# Patient Record
Sex: FEMALE | Race: WHITE | Employment: UNEMPLOYED | URBAN - METROPOLITAN AREA
[De-identification: names, ages, dates, MRNs, and addresses within clinical notes are randomized per-mention and may not be internally consistent; named-entity substitution may affect disease eponyms.]

---

## 2017-03-07 ENCOUNTER — HOSPITAL ENCOUNTER (OUTPATIENT)
Dept: CT IMAGING | Age: 52
Discharge: HOME | End: 2017-03-07
Payer: COMMERCIAL

## 2017-03-07 DIAGNOSIS — C85.91 NON-HODGKIN LYMPHOMA OF LYMPH NODES OF NECK, UNSPECIFIED NON-HODGKIN LYMPHOMA TYPE (HCC): ICD-10-CM

## 2017-03-07 PROCEDURE — 74177 CT ABD & PELVIS W/CONTRAST: CPT

## 2017-03-07 PROCEDURE — 2500000003 HC RX 250 WO HCPCS: Performed by: RADIOLOGY

## 2017-03-07 PROCEDURE — 70491 CT SOFT TISSUE NECK W/DYE: CPT

## 2017-03-07 PROCEDURE — 71260 CT THORAX DX C+: CPT

## 2017-03-07 PROCEDURE — 6360000004 HC RX CONTRAST MEDICATION: Performed by: RADIOLOGY

## 2017-03-07 RX ADMIN — BARIUM SULFATE 450 ML: 21 SUSPENSION ORAL at 08:41

## 2017-03-07 RX ADMIN — IOVERSOL 100 ML: 678 INJECTION INTRA-ARTERIAL; INTRAVENOUS at 08:41

## 2017-04-10 ENCOUNTER — OFFICE VISIT (OUTPATIENT)
Dept: FAMILY MEDICINE CLINIC | Age: 52
End: 2017-04-10

## 2017-04-10 VITALS
BODY MASS INDEX: 33.27 KG/M2 | HEIGHT: 67 IN | WEIGHT: 212 LBS | DIASTOLIC BLOOD PRESSURE: 70 MMHG | RESPIRATION RATE: 12 BRPM | TEMPERATURE: 97.8 F | HEART RATE: 94 BPM | SYSTOLIC BLOOD PRESSURE: 110 MMHG

## 2017-04-10 DIAGNOSIS — J45.21 RAD (REACTIVE AIRWAY DISEASE) WITH WHEEZING, MILD INTERMITTENT, WITH ACUTE EXACERBATION: Primary | ICD-10-CM

## 2017-04-10 DIAGNOSIS — L82.0 SEBORRHEIC KERATOSIS, INFLAMED: ICD-10-CM

## 2017-04-10 PROCEDURE — 99212 OFFICE O/P EST SF 10 MIN: CPT | Performed by: FAMILY MEDICINE

## 2017-04-10 RX ORDER — ISOSORBIDE MONONITRATE 60 MG/1
TABLET, EXTENDED RELEASE ORAL
Refills: 3 | COMMUNITY
Start: 2017-03-14

## 2017-04-10 RX ORDER — ALBUTEROL SULFATE 0.63 MG/3ML
1 SOLUTION RESPIRATORY (INHALATION)
Qty: 270 ML | Status: CANCELLED | OUTPATIENT
Start: 2017-04-10

## 2017-04-10 RX ORDER — DILTIAZEM HYDROCHLORIDE 120 MG/1
120 CAPSULE, EXTENDED RELEASE ORAL 2 TIMES DAILY
COMMUNITY

## 2017-04-11 RX ORDER — ALBUTEROL SULFATE 90 UG/1
2 AEROSOL, METERED RESPIRATORY (INHALATION) EVERY 6 HOURS PRN
Qty: 1 INHALER | Refills: 3 | Status: SHIPPED | OUTPATIENT
Start: 2017-04-11

## 2017-04-17 ENCOUNTER — PROCEDURE VISIT (OUTPATIENT)
Dept: FAMILY MEDICINE CLINIC | Age: 52
End: 2017-04-17

## 2017-04-17 VITALS
SYSTOLIC BLOOD PRESSURE: 98 MMHG | HEART RATE: 89 BPM | RESPIRATION RATE: 12 BRPM | BODY MASS INDEX: 33.27 KG/M2 | TEMPERATURE: 98.7 F | DIASTOLIC BLOOD PRESSURE: 62 MMHG | HEIGHT: 67 IN | WEIGHT: 212 LBS

## 2017-04-17 DIAGNOSIS — L82.0 SEBORRHEIC KERATOSES, INFLAMED: Primary | ICD-10-CM

## 2017-04-17 PROCEDURE — 11400 EXC TR-EXT B9+MARG 0.5 CM<: CPT | Performed by: FAMILY MEDICINE

## 2017-04-20 ENCOUNTER — OFFICE VISIT (OUTPATIENT)
Dept: FAMILY MEDICINE CLINIC | Age: 52
End: 2017-04-20

## 2017-04-20 VITALS
HEIGHT: 67 IN | SYSTOLIC BLOOD PRESSURE: 96 MMHG | DIASTOLIC BLOOD PRESSURE: 62 MMHG | WEIGHT: 212 LBS | HEART RATE: 89 BPM | TEMPERATURE: 96.4 F | BODY MASS INDEX: 33.27 KG/M2 | RESPIRATION RATE: 12 BRPM

## 2017-04-20 DIAGNOSIS — L76.21 POSTPROCEDURAL HEMORRHAGE OF SKIN AND SUBCUTANEOUS TISSUE FOLLOWING A DERMATOLOGIC PROCEDURE: Primary | ICD-10-CM

## 2017-04-20 PROCEDURE — 99024 POSTOP FOLLOW-UP VISIT: CPT | Performed by: FAMILY MEDICINE

## 2017-04-20 RX ORDER — CLINDAMYCIN HYDROCHLORIDE 300 MG/1
CAPSULE ORAL
Qty: 15 CAPSULE | Refills: 0 | Status: SHIPPED | OUTPATIENT
Start: 2017-04-20 | End: 2017-11-08 | Stop reason: ALTCHOICE

## 2017-04-23 ASSESSMENT — ENCOUNTER SYMPTOMS
WHEEZING: 1
BLOOD IN STOOL: 0
SHORTNESS OF BREATH: 0
ABDOMINAL PAIN: 0
COUGH: 1
CHEST TIGHTNESS: 0

## 2017-04-26 ENCOUNTER — PATIENT MESSAGE (OUTPATIENT)
Dept: FAMILY MEDICINE CLINIC | Age: 52
End: 2017-04-26

## 2017-04-26 RX ORDER — LIDOCAINE HYDROCHLORIDE 20 MG/ML
1 INJECTION, SOLUTION INFILTRATION; PERINEURAL ONCE
Status: SHIPPED | OUTPATIENT
Start: 2017-04-26

## 2017-04-26 RX ORDER — FLUCONAZOLE 150 MG/1
TABLET ORAL
Qty: 2 TABLET | Refills: 1 | Status: SHIPPED | OUTPATIENT
Start: 2017-04-26 | End: 2017-07-05

## 2017-04-26 ASSESSMENT — ENCOUNTER SYMPTOMS
COUGH: 0
SHORTNESS OF BREATH: 0

## 2017-05-03 ENCOUNTER — OFFICE VISIT (OUTPATIENT)
Dept: FAMILY MEDICINE CLINIC | Age: 52
End: 2017-05-03

## 2017-05-03 VITALS
HEIGHT: 67 IN | TEMPERATURE: 97.9 F | HEART RATE: 89 BPM | RESPIRATION RATE: 12 BRPM | BODY MASS INDEX: 32.65 KG/M2 | WEIGHT: 208 LBS | SYSTOLIC BLOOD PRESSURE: 92 MMHG | DIASTOLIC BLOOD PRESSURE: 60 MMHG

## 2017-05-03 DIAGNOSIS — L82.0 SEBORRHEIC KERATOSIS, INFLAMED: Primary | ICD-10-CM

## 2017-05-03 DIAGNOSIS — Z12.39 SCREENING FOR BREAST CANCER: ICD-10-CM

## 2017-05-03 PROCEDURE — 99024 POSTOP FOLLOW-UP VISIT: CPT | Performed by: FAMILY MEDICINE

## 2017-05-03 ASSESSMENT — PATIENT HEALTH QUESTIONNAIRE - PHQ9
1. LITTLE INTEREST OR PLEASURE IN DOING THINGS: 0
2. FEELING DOWN, DEPRESSED OR HOPELESS: 0
SUM OF ALL RESPONSES TO PHQ QUESTIONS 1-9: 0
SUM OF ALL RESPONSES TO PHQ9 QUESTIONS 1 & 2: 0

## 2017-05-09 DIAGNOSIS — L02.219 CELLULITIS AND ABSCESS OF TRUNK: Primary | ICD-10-CM

## 2017-05-09 DIAGNOSIS — L03.319 CELLULITIS AND ABSCESS OF TRUNK: Primary | ICD-10-CM

## 2017-05-09 RX ORDER — CLINDAMYCIN HYDROCHLORIDE 150 MG/1
150 CAPSULE ORAL 4 TIMES DAILY
Qty: 28 CAPSULE | Refills: 0 | Status: SHIPPED | OUTPATIENT
Start: 2017-05-09 | End: 2017-05-16

## 2017-05-14 ASSESSMENT — ENCOUNTER SYMPTOMS
CHEST TIGHTNESS: 0
SHORTNESS OF BREATH: 0
BACK PAIN: 0

## 2017-05-31 LAB — HBA1C MFR BLD: 6.9 %

## 2017-07-05 ENCOUNTER — OFFICE VISIT (OUTPATIENT)
Dept: FAMILY MEDICINE CLINIC | Age: 52
End: 2017-07-05

## 2017-07-05 VITALS
RESPIRATION RATE: 14 BRPM | OXYGEN SATURATION: 98 % | DIASTOLIC BLOOD PRESSURE: 62 MMHG | BODY MASS INDEX: 33.45 KG/M2 | WEIGHT: 213.6 LBS | SYSTOLIC BLOOD PRESSURE: 128 MMHG | TEMPERATURE: 97.6 F | HEART RATE: 86 BPM

## 2017-07-05 DIAGNOSIS — Z87.2 H/O SEBORRHEIC KERATOSIS: ICD-10-CM

## 2017-07-05 DIAGNOSIS — E11.9 TYPE 2 DIABETES MELLITUS WITHOUT COMPLICATION, WITHOUT LONG-TERM CURRENT USE OF INSULIN (HCC): ICD-10-CM

## 2017-07-05 DIAGNOSIS — E78.5 DYSLIPIDEMIA: Primary | ICD-10-CM

## 2017-07-05 DIAGNOSIS — I20.1 CORONARY ARTERY SPASM (HCC): ICD-10-CM

## 2017-07-05 PROCEDURE — 99213 OFFICE O/P EST LOW 20 MIN: CPT | Performed by: FAMILY MEDICINE

## 2017-07-05 RX ORDER — METOPROLOL TARTRATE 100 MG/1
100 TABLET ORAL 2 TIMES DAILY
Refills: 3 | COMMUNITY
Start: 2017-06-11 | End: 2018-03-07

## 2017-07-05 RX ORDER — PEN NEEDLE, DIABETIC 31 GX5/16"
NEEDLE, DISPOSABLE MISCELLANEOUS
Refills: 2 | COMMUNITY
Start: 2017-05-22

## 2017-07-05 RX ORDER — PIOGLITAZONEHYDROCHLORIDE 15 MG/1
TABLET ORAL
Refills: 11 | COMMUNITY
Start: 2017-04-14 | End: 2017-11-08 | Stop reason: ALTCHOICE

## 2017-07-05 RX ORDER — DILTIAZEM HYDROCHLORIDE 120 MG/1
CAPSULE, EXTENDED RELEASE ORAL
Refills: 3 | COMMUNITY
Start: 2017-06-18 | End: 2017-11-08 | Stop reason: ALTCHOICE

## 2017-07-16 ASSESSMENT — ENCOUNTER SYMPTOMS
SHORTNESS OF BREATH: 0
BLOOD IN STOOL: 0
ABDOMINAL PAIN: 0

## 2017-08-15 ENCOUNTER — HOSPITAL ENCOUNTER (OUTPATIENT)
Dept: WOMENS IMAGING | Age: 52
Discharge: HOME | End: 2017-08-15
Payer: COMMERCIAL

## 2017-08-15 DIAGNOSIS — Z12.39 SCREENING FOR BREAST CANCER: ICD-10-CM

## 2017-08-15 PROCEDURE — G0202 SCR MAMMO BI INCL CAD: HCPCS

## 2017-08-21 DIAGNOSIS — R92.1 BREAST CALCIFICATIONS ON MAMMOGRAM: Primary | ICD-10-CM

## 2017-08-30 ENCOUNTER — HOSPITAL ENCOUNTER (OUTPATIENT)
Dept: ULTRASOUND IMAGING | Age: 52
End: 2017-08-30
Payer: COMMERCIAL

## 2017-08-30 ENCOUNTER — HOSPITAL ENCOUNTER (OUTPATIENT)
Dept: WOMENS IMAGING | Age: 52
Discharge: HOME | End: 2017-08-30
Payer: COMMERCIAL

## 2017-08-30 DIAGNOSIS — R92.1 BREAST CALCIFICATIONS ON MAMMOGRAM: ICD-10-CM

## 2017-08-30 PROCEDURE — G0206 DX MAMMO INCL CAD UNI: HCPCS

## 2017-10-31 LAB
CHOLESTEROL, TOTAL: 107 MG/DL
CHOLESTEROL/HDL RATIO: ABNORMAL
HDLC SERPL-MCNC: 28 MG/DL (ref 35–70)
LDL CHOLESTEROL CALCULATED: 15 MG/DL (ref 0–160)
TRIGL SERPL-MCNC: 318 MG/DL
VLDLC SERPL CALC-MCNC: 64 MG/DL

## 2017-11-08 ENCOUNTER — OFFICE VISIT (OUTPATIENT)
Dept: FAMILY MEDICINE CLINIC | Age: 52
End: 2017-11-08

## 2017-11-08 VITALS
WEIGHT: 211 LBS | RESPIRATION RATE: 12 BRPM | HEART RATE: 81 BPM | TEMPERATURE: 97.9 F | SYSTOLIC BLOOD PRESSURE: 102 MMHG | BODY MASS INDEX: 33.12 KG/M2 | HEIGHT: 67 IN | DIASTOLIC BLOOD PRESSURE: 68 MMHG

## 2017-11-08 DIAGNOSIS — J45.20 MILD INTERMITTENT REACTIVE AIRWAY DISEASE WITHOUT COMPLICATION: ICD-10-CM

## 2017-11-08 DIAGNOSIS — E78.5 DYSLIPIDEMIA: Primary | ICD-10-CM

## 2017-11-08 PROCEDURE — 99213 OFFICE O/P EST LOW 20 MIN: CPT | Performed by: FAMILY MEDICINE

## 2017-11-08 NOTE — PROGRESS NOTES
Subjective  Tim Canas, 45 y.o. female presents today with:  Chief Complaint   Patient presents with    Hyperlipidemia     4 mnth f/u        HPI  Patient presents today for a four month follow up for dyslipidemia. Overall, doing well  ; No cardio-pulmonary probs;compliant with diet/rxs;no new gi-gu complaints   Rev/rxs; No abuse nor side effects on meds   F/u Onco-clearred and cts --neg   Coryza--mild  Review of Systems   Constitutional: Negative for fatigue and unexpected weight change. Eyes: Negative for visual disturbance. Respiratory: Negative for chest tightness and shortness of breath. Cardiovascular: Negative for chest pain, palpitations and leg swelling. Gastrointestinal: Negative for abdominal pain and blood in stool. Genitourinary: Negative for dysuria, flank pain and frequency. Musculoskeletal: Positive for myalgias. Negative for arthralgias and back pain. Neurological: Negative for weakness, light-headedness and numbness. Allergies   Allergen Reactions    Cefdinir Hives    Codeine      pt states she can take synthetic codeine like in Vicodin    Darvocet [Propoxyphene N-Acetaminophen]     Sulfa Antibiotics     Tetracyclines & Related        Past Medical History:   Diagnosis Date    CAD (coronary artery disease)     Cough variant asthma     [facility] mellitus, type 2 ([facility] [address]     Diffuse large B cell lymphoma ([facility] [address]     Dyslipidemia     Hypertension     Metatarsalgia of right foot     Palpitations     Perennial allergic rhinitis      Past Surgical History:   Procedure Laterality Date    CARDIAC CATHETERIZATION  2010    CARDIAC CATHETERIZATION  03/06/14    DR Delaney Hightower CHOLECYSTECTOMY  2008    PTCA       Social History     Social History    Marital status:      Spouse name: N/A    Number of children: N/A    Years of education: N/A     Occupational History    Not on file.      Social History Main Topics    Smoking status: Never Smoker    Smokeless tobacco: Never Used    Alcohol use Yes     2 Glasses of wine per week    Drug use: No    Sexual activity: Not on file     Other Topics Concern    Not on file     Social History Narrative    No narrative on file     Family History   Problem Relation Age of Onset    Cancer Mother     High Blood Pressure Mother     High Cholesterol Father     Heart Disease Father      ARRYTHMIA, PVC'S          Current Outpatient Prescriptions on File Prior to Visit   Medication Sig Dispense Refill    B-D ULTRAFINE III SHORT PEN 31G X 8 MM MISC Use twice daily  2    Glimepiride (AMARYL PO) Take 8 mg by mouth daily      metoprolol (LOPRESSOR) 100 MG tablet 100 mg 2 times daily  3    albuterol sulfate  (90 BASE) MCG/ACT inhaler Inhale 2 puffs into the lungs every 6 hours as needed for Wheezing 1 Inhaler 3    isosorbide mononitrate (IMDUR) 60 MG extended release tablet TAKE ONE TABLET BY MOUTH EVERY DAY  3    diltiazem (CARDIZEM 12 HR) 120 MG extended release capsule Take 120 mg by mouth 2 times daily      insulin glargine (LANTUS SOLOSTAR) 100 UNIT/ML injection pen Inject 20 Units into the skin      levothyroxine (SYNTHROID) 88 MCG tablet   10    vitamin B-12 (CYANOCOBALAMIN) 1000 MCG tablet Take 1,000 mcg by mouth daily      magnesium oxide (MAG-OX) 400 MG tablet Take 400 mg by mouth      metFORMIN ER (GLUCOPHAGE-XR) 500 MG XR tablet Take 1,000 mg by mouth 2 times daily   2    clopidogrel (PLAVIX) 75 MG tablet   2    BYDUREON 2 MG SUSR injection   5    fenofibrate (TRICOR) 145 MG tablet Take 145 mg by mouth daily.  metoprolol (LOPRESSOR) 50 MG tablet Take 50 mg by mouth every morning.  lisinopril (PRINIVIL;ZESTRIL) 2.5 MG tablet Take 2.5 mg by mouth daily.  spironolactone (ALDACTONE) 25 MG tablet Take 25 mg by mouth 2 times daily.  lovastatin (MEVACOR) 20 MG tablet Take 2 tablets by mouth daily.        Cholecalciferol (VITAMIN D PO) Take 3,000 Int'l Units by mouth daily       nitroGLYCERIN (NITROSTAT) 0.4 MG SL tablet Place 0.4 mg under the tongue every 5 minutes as needed.  aspirin 81 MG EC tablet Take 81 mg by mouth daily. Current Facility-Administered Medications on File Prior to Visit   Medication Dose Route Frequency Provider Last Rate Last Dose    lidocaine 2 % injection 1 mL  1 mL Intradermal Once Reji Velazquez DO           Objective    Vitals:    11/08/17 0756   BP: 102/68   Pulse: 81   Resp: 12   Temp: 97.9 °F (36.6 °C)   TempSrc: Temporal   Weight: 211 lb (95.7 kg)   Height: 5' 7\" (1.702 m)     Physical Exam   Constitutional: She is well-developed, well-nourished, and in no distress. No distress. Eyes: No scleral icterus. Neck: Normal range of motion. Neck supple. Carotid bruit is not present. No neck rigidity. No thyroid mass and no thyromegaly present. Cardiovascular: Normal rate, regular rhythm, S1 normal, S2 normal and normal heart sounds. No extrasystoles are present. No murmur heard. Pulmonary/Chest: Effort normal and breath sounds normal.   Musculoskeletal: She exhibits no edema. Skin: She is not diaphoretic. Psychiatric: Affect normal.     Orders Only on 11/07/2017   Component Date Value Ref Range Status    Cholesterol, Total 10/31/2017 107  mg/dL Final    HDL 10/31/2017 28* 35 - 70 mg/dL Final    LDL Calculated 10/31/2017 15  0 - 160 mg/dL Final    Triglycerides 10/31/2017 318  mg/dL Final    VLDL 10/31/2017 64  mg/dL Final   rev low hdl[chronic]  Rest--ok  ; Assessment & Plan   1. Dyslipidemia     2. Mild intermittent reactive airway disease without complication     ;See above orders, as use and side effects reviewed ;Continue same meds, as common side effects and use reviewed-call if ineffective or problems ;   Outpatient Encounter Prescriptions as of 11/8/2017   Medication Sig Dispense Refill    B-D ULTRAFINE III SHORT PEN 31G X 8 MM MISC Use twice daily  2    Glimepiride (AMARYL PO) Take 8 mg by mouth daily      metoprolol (LOPRESSOR) 100 MG

## 2017-11-16 ASSESSMENT — ENCOUNTER SYMPTOMS
ABDOMINAL PAIN: 0
BLOOD IN STOOL: 0
SHORTNESS OF BREATH: 0
BACK PAIN: 0
CHEST TIGHTNESS: 0

## 2017-11-16 NOTE — PROGRESS NOTES
Subjective:      Patient ID: Silvia Loving is a 45 y.o. female.     HPI    Review of Systems    Objective:   Physical Exam    Assessment:      ***      Plan:      ***

## 2018-01-05 ENCOUNTER — OFFICE VISIT (OUTPATIENT)
Dept: FAMILY MEDICINE CLINIC | Age: 53
End: 2018-01-05

## 2018-01-05 VITALS
WEIGHT: 200 LBS | BODY MASS INDEX: 31.39 KG/M2 | SYSTOLIC BLOOD PRESSURE: 120 MMHG | RESPIRATION RATE: 14 BRPM | HEART RATE: 84 BPM | HEIGHT: 67 IN | TEMPERATURE: 96.9 F | DIASTOLIC BLOOD PRESSURE: 78 MMHG

## 2018-01-05 DIAGNOSIS — K11.20 PAROTIDITIS: Primary | ICD-10-CM

## 2018-01-05 DIAGNOSIS — M26.621 ARTHRALGIA OF RIGHT TEMPOROMANDIBULAR JOINT: ICD-10-CM

## 2018-01-05 PROCEDURE — 99213 OFFICE O/P EST LOW 20 MIN: CPT | Performed by: NURSE PRACTITIONER

## 2018-01-05 RX ORDER — AMOXICILLIN AND CLAVULANATE POTASSIUM 875; 125 MG/1; MG/1
TABLET, FILM COATED ORAL
Refills: 0 | COMMUNITY
Start: 2017-12-27 | End: 2018-01-05 | Stop reason: ALTCHOICE

## 2018-01-05 RX ORDER — METHYLPREDNISOLONE 4 MG/1
TABLET ORAL
Qty: 1 KIT | Refills: 0 | Status: SHIPPED | OUTPATIENT
Start: 2018-01-05 | End: 2018-01-15 | Stop reason: ALTCHOICE

## 2018-01-05 RX ORDER — CLINDAMYCIN HYDROCHLORIDE 300 MG/1
300 CAPSULE ORAL 4 TIMES DAILY
Qty: 28 CAPSULE | Refills: 0 | Status: SHIPPED | OUTPATIENT
Start: 2018-01-05 | End: 2018-01-12

## 2018-01-05 RX ORDER — CYCLOBENZAPRINE HCL 5 MG
5 TABLET ORAL 3 TIMES DAILY PRN
Qty: 60 TABLET | Refills: 0 | Status: SHIPPED | OUTPATIENT
Start: 2018-01-05 | End: 2018-03-07 | Stop reason: SDUPTHER

## 2018-01-05 RX ORDER — CYCLOBENZAPRINE HCL 5 MG
5 TABLET ORAL 3 TIMES DAILY PRN
COMMUNITY
End: 2018-01-05 | Stop reason: SDUPTHER

## 2018-01-10 PROBLEM — K11.20 PAROTIDITIS: Status: ACTIVE | Noted: 2018-01-10

## 2018-01-10 PROBLEM — M26.621 ARTHRALGIA OF RIGHT TEMPOROMANDIBULAR JOINT: Status: ACTIVE | Noted: 2018-01-10

## 2018-01-10 PROBLEM — E04.2 NONTOXIC MULTINODULAR GOITER: Status: ACTIVE | Noted: 2017-11-14

## 2018-01-10 NOTE — PROGRESS NOTES
Subjective  Gregoria Radford, 45 y.o. female presents today with:  Chief Complaint   Patient presents with    Jaw Pain    Temporomandibular Joint Pain       Patient presents today with pain and swelling on the right side of her jaw and face. The patient states that she has a history of parotitis and TMJ. The patient states that her jaw is clicking and sore around her ears. The patient states that her lower jaw is very swollen and painful. The patient states that this has been getting worse over the last 2 days. The patient denies fever, nasal congestion, or cough. Objective    Vitals:    01/05/18 1003   BP: 120/78   Site: Left Arm   Position: Sitting   Cuff Size: Medium Adult   Pulse: 84   Resp: 14   Temp: 96.9 °F (36.1 °C)   TempSrc: Temporal   Weight: 200 lb (90.7 kg)   Height: 5' 7\" (1.702 m)       Physical Exam   Constitutional: She is oriented to person, place, and time. She appears well-developed and well-nourished. HENT:   Head: Normocephalic and atraumatic. Right Ear: Hearing, tympanic membrane, external ear and ear canal normal.   Left Ear: Hearing, tympanic membrane, external ear and ear canal normal.   Nose: Nose normal.   Mouth/Throat: Uvula is midline, oropharynx is clear and moist and mucous membranes are normal. No dental abscesses. Eyes: EOM are normal.   Neck: Normal range of motion. Cardiovascular: Normal rate, regular rhythm and normal heart sounds. Pulmonary/Chest: Effort normal and breath sounds normal.   Abdominal: Soft. Bowel sounds are normal.   Musculoskeletal: Normal range of motion. Neurological: She is alert and oriented to person, place, and time. Skin: Skin is warm and dry. Psychiatric: She has a normal mood and affect. Assessment & Plan   1. Parotiditis  clindamycin (CLEOCIN) 300 MG capsule    Symptomatic, RXs given, Patient given information on how to reduce the inflammed parotid.     2. Arthralgia of right temporomandibular joint

## 2018-01-15 ENCOUNTER — OFFICE VISIT (OUTPATIENT)
Dept: FAMILY MEDICINE CLINIC | Age: 53
End: 2018-01-15

## 2018-01-15 VITALS
HEIGHT: 67 IN | BODY MASS INDEX: 31.39 KG/M2 | HEART RATE: 78 BPM | RESPIRATION RATE: 14 BRPM | DIASTOLIC BLOOD PRESSURE: 74 MMHG | WEIGHT: 200 LBS | TEMPERATURE: 96.6 F | SYSTOLIC BLOOD PRESSURE: 116 MMHG

## 2018-01-15 DIAGNOSIS — L25.1 CONTACT DERMATITIS AND ECZEMA DUE TO DRUGS AND MEDICINES IN CONTACT WITH SKIN: ICD-10-CM

## 2018-01-15 DIAGNOSIS — S03.40XS TMJ (SPRAIN OF TEMPOROMANDIBULAR JOINT), SEQUELA: ICD-10-CM

## 2018-01-15 DIAGNOSIS — K11.21 ACUTE PAROTITIS: Primary | ICD-10-CM

## 2018-01-15 PROCEDURE — 99213 OFFICE O/P EST LOW 20 MIN: CPT | Performed by: FAMILY MEDICINE

## 2018-01-15 RX ORDER — DILTIAZEM HYDROCHLORIDE 120 MG/1
CAPSULE, EXTENDED RELEASE ORAL
Refills: 2 | COMMUNITY
Start: 2018-01-14 | End: 2018-06-06 | Stop reason: SDUPTHER

## 2018-01-15 NOTE — PROGRESS NOTES
Subjective  Tacey Bejosse, 45 y.o. female presents today with:  Chief Complaint   Patient presents with    Temporomandibular Joint Pain     10 day f/u per NP    Rash     legs bilateral       HPI  Patient presents today for a ten day follow up per NP for temporomandibular joint pain. On NSAIDS and wmc-tmj tolerable  On cleocin-much improved swelling and pain of r-parotid  Rest/ent-neg  ; No cardio-pulmonary probs;compliant with diet/rxs;no new gi-gu complaints   Newer itchy red rash over pretibial areas after holiday moisturizer  Rev/rxs; No abuse nor side effects on meds   ; No eye/foot probs;on asa and on no tob. Review of Systems   Constitutional: Negative for fatigue, fever and unexpected weight change. HENT: Positive for ear pain (improved of pre-ear area of tmj). Negative for congestion, sinus pressure, sore throat, trouble swallowing and voice change. Eyes: Negative for visual disturbance. Respiratory: Negative for shortness of breath. Cardiovascular: Negative for chest pain, palpitations and leg swelling. Gastrointestinal: Negative for abdominal pain. Genitourinary: Positive for urgency. Negative for decreased urine volume, dysuria and flank pain. Skin: Positive for rash. Neurological: Negative for light-headedness and headaches. Hematological: Does not bruise/bleed easily. Psychiatric/Behavioral: Negative for dysphoric mood.        Allergies   Allergen Reactions    Cefdinir Hives    Codeine      pt states she can take synthetic codeine like in Vicodin    Darvocet [Propoxyphene N-Acetaminophen]     Sulfa Antibiotics     Tetracyclines & Related        Past Medical History:   Diagnosis Date    CAD (coronary artery disease)     Cough variant asthma     Diabetes mellitus, type 2 (HCC)     Diffuse large B cell lymphoma ([facility] [address]     Dyslipidemia     Hypertension     Metatarsalgia of right foot     Palpitations     Perennial allergic rhinitis      Past Surgical History:  clopidogrel (PLAVIX) 75 MG tablet   2    BYDUREON 2 MG SUSR injection   5    fenofibrate (TRICOR) 145 MG tablet Take 145 mg by mouth daily.  metoprolol (LOPRESSOR) 50 MG tablet Take 50 mg by mouth every morning.  lisinopril (PRINIVIL;ZESTRIL) 2.5 MG tablet Take 2.5 mg by mouth daily.  spironolactone (ALDACTONE) 25 MG tablet Take 25 mg by mouth 2 times daily.  lovastatin (MEVACOR) 20 MG tablet Take 2 tablets by mouth daily.  Cholecalciferol (VITAMIN D PO) Take 3,000 Int'l Units by mouth daily       aspirin 81 MG EC tablet Take 81 mg by mouth daily.  nitroGLYCERIN (NITROSTAT) 0.4 MG SL tablet Place 0.4 mg under the tongue every 5 minutes as needed. Current Facility-Administered Medications on File Prior to Visit   Medication Dose Route Frequency Provider Last Rate Last Dose    lidocaine 2 % injection 1 mL  1 mL Intradermal Once Carmel Avery DO           Objective    Vitals:    01/15/18 0918   BP: 116/74   Pulse: 78   Resp: 14   Temp: 96.6 °F (35.9 °C)   TempSrc: Temporal   Weight: 200 lb (90.7 kg)   Height: 5' 7\" (1.702 m)     Physical Exam   Constitutional: She is oriented to person, place, and time and well-developed, well-nourished, and in no distress. No distress. HENT:   Head:       Right Ear: Ear canal normal. No tenderness. Tympanic membrane is retracted. Tympanic membrane is not injected. No middle ear effusion. No decreased hearing is noted. Left Ear: Ear canal normal. No tenderness. Tympanic membrane is retracted. Tympanic membrane is not injected. No middle ear effusion. No decreased hearing is noted. Nose: Mucosal edema and rhinorrhea present. Right sinus exhibits no maxillary sinus tenderness. Left sinus exhibits no maxillary sinus tenderness. Mouth/Throat: Oropharynx is clear and moist.   Minor tx w/ opening  No swelling/tx of r-parotid   Eyes: No scleral icterus. Neck: Normal range of motion. Neck supple. Carotid bruit is not present.  No

## 2018-01-22 ASSESSMENT — ENCOUNTER SYMPTOMS
TROUBLE SWALLOWING: 0
SHORTNESS OF BREATH: 0
SORE THROAT: 0
SINUS PRESSURE: 0
ABDOMINAL PAIN: 0
VOICE CHANGE: 0

## 2018-03-06 LAB
ALBUMIN SERPL-MCNC: 4.6 G/DL
ALP BLD-CCNC: 51 U/L
ALT SERPL-CCNC: 36 U/L
ANION GAP SERPL CALCULATED.3IONS-SCNC: 18 MMOL/L
AST SERPL-CCNC: 36 U/L
AVERAGE GLUCOSE: 166
BILIRUB SERPL-MCNC: 0.6 MG/DL (ref 0.1–1.4)
BUN BLDV-MCNC: 16 MG/DL
CALCIUM SERPL-MCNC: 10.5 MG/DL
CHLORIDE BLD-SCNC: 98 MMOL/L
CO2: 22 MMOL/L
CREAT SERPL-MCNC: 1 MG/DL
GFR CALCULATED: 58
GLUCOSE BLD-MCNC: 139 MG/DL
HBA1C MFR BLD: 7.4 %
POTASSIUM SERPL-SCNC: 4.9 MMOL/L
SODIUM BLD-SCNC: 138 MMOL/L
TOTAL PROTEIN: 7.2
TSH SERPL DL<=0.05 MIU/L-ACNC: 2.37 UIU/ML

## 2018-03-07 ENCOUNTER — OFFICE VISIT (OUTPATIENT)
Dept: FAMILY MEDICINE CLINIC | Age: 53
End: 2018-03-07
Payer: COMMERCIAL

## 2018-03-07 VITALS
RESPIRATION RATE: 16 BRPM | DIASTOLIC BLOOD PRESSURE: 76 MMHG | HEART RATE: 91 BPM | HEIGHT: 67 IN | SYSTOLIC BLOOD PRESSURE: 94 MMHG | WEIGHT: 204 LBS | TEMPERATURE: 97.2 F | BODY MASS INDEX: 32.02 KG/M2

## 2018-03-07 DIAGNOSIS — E78.5 DYSLIPIDEMIA: ICD-10-CM

## 2018-03-07 DIAGNOSIS — I10 ESSENTIAL HYPERTENSION: Primary | ICD-10-CM

## 2018-03-07 DIAGNOSIS — M26.621 ARTHRALGIA OF RIGHT TEMPOROMANDIBULAR JOINT: ICD-10-CM

## 2018-03-07 PROCEDURE — 99213 OFFICE O/P EST LOW 20 MIN: CPT | Performed by: FAMILY MEDICINE

## 2018-03-07 RX ORDER — CYCLOBENZAPRINE HCL 5 MG
5 TABLET ORAL 3 TIMES DAILY PRN
Qty: 60 TABLET | Refills: 0 | Status: SHIPPED | OUTPATIENT
Start: 2018-03-07 | End: 2018-05-08 | Stop reason: SDUPTHER

## 2018-03-07 NOTE — PROGRESS NOTES
Subjective  Fátima Miguel, 52 y.o. female presents today with:  Chief Complaint   Patient presents with    Hyperlipidemia     4 mnth f/u; dyslipidemia   ENDO f/u DM --Dr. Timmy Jefferson    HPI  Patient presents today for a four month follow up for dyslipidemia. Pt did best for r-TMJ w/ wmc/flexeril--occ pain  No new adenopathy w/ hx/lymphoma  ; No cardio-pulmonary probs;compliant with diet/rxs;no new gi-gu complaints   Rev/rxs; No abuse nor side effects on meds   On better diet  Review of Systems   Constitutional: Negative for fatigue and fever. HENT: Positive for congestion and ear pain (tmj). Eyes: Negative for visual disturbance. Respiratory: Negative for cough and shortness of breath. Cardiovascular: Negative for chest pain, palpitations and leg swelling. Gastrointestinal: Negative for abdominal pain and nausea. Genitourinary: Negative for dysuria and frequency. Musculoskeletal: Positive for myalgias. Neurological: Positive for headaches. Negative for weakness and light-headedness. Hematological: Negative for adenopathy. Psychiatric/Behavioral: Negative for behavioral problems.        Allergies   Allergen Reactions    Cefdinir Hives    Codeine      pt states she can take synthetic codeine like in Vicodin    Darvocet [Propoxyphene N-Acetaminophen]     Sulfa Antibiotics     Tetracyclines & Related        Past Medical History:   Diagnosis Date    CAD (coronary artery disease)     Cough variant asthma     Diabetes mellitus, type 2 ([address]     Diffuse large B cell lymphoma ([facility] [address]     Dyslipidemia     Hypertension     Metatarsalgia of right foot     Palpitations     Perennial allergic rhinitis      Past Surgical History:   Procedure Laterality Date    CARDIAC CATHETERIZATION  2010    CARDIAC CATHETERIZATION  03/06/14    DR Eloy Watson CHOLECYSTECTOMY  2008    PTCA       Social History     Social History    Marital status:      Spouse name: N/A    Number of children: N/A    Years of education: N/A     Occupational History    Not on file. Social History Main Topics    Smoking status: Never Smoker    Smokeless tobacco: Never Used    Alcohol use Yes     2 Glasses of wine per week    Drug use: No    Sexual activity: Not on file     Other Topics Concern    Not on file     Social History Narrative    No narrative on file     Family History   Problem Relation Age of Onset    Cancer Mother     High Blood Pressure Mother     High Cholesterol Father     Heart Disease Father      ARRYTHMIA, PVC'S          Current Outpatient Prescriptions on File Prior to Visit   Medication Sig Dispense Refill    CARTIA  MG extended release capsule   2    insulin glargine (LANTUS) 100 UNIT/ML injection pen Inject 12 units subcutaneously daily in the morning.  B-D ULTRAFINE III SHORT PEN 31G X 8 MM MISC Use twice daily  2    Glimepiride (AMARYL PO) Take 8 mg by mouth daily      albuterol sulfate  (90 BASE) MCG/ACT inhaler Inhale 2 puffs into the lungs every 6 hours as needed for Wheezing 1 Inhaler 3    isosorbide mononitrate (IMDUR) 60 MG extended release tablet TAKE ONE TABLET BY MOUTH EVERY DAY  3    diltiazem (CARDIZEM 12 HR) 120 MG extended release capsule Take 120 mg by mouth 2 times daily      levothyroxine (SYNTHROID) 88 MCG tablet   10    vitamin B-12 (CYANOCOBALAMIN) 1000 MCG tablet Take 1,000 mcg by mouth daily      magnesium oxide (MAG-OX) 400 MG tablet Take 400 mg by mouth      metFORMIN ER (GLUCOPHAGE-XR) 500 MG XR tablet Take 1,000 mg by mouth 2 times daily   2    clopidogrel (PLAVIX) 75 MG tablet   2    BYDUREON 2 MG SUSR injection   5    fenofibrate (TRICOR) 145 MG tablet Take 145 mg by mouth daily.  metoprolol (LOPRESSOR) 50 MG tablet Take 50 mg by mouth every morning.  lisinopril (PRINIVIL;ZESTRIL) 2.5 MG tablet Take 2.5 mg by mouth daily.  spironolactone (ALDACTONE) 25 MG tablet Take 25 mg by mouth 2 times daily.         lovastatin magnesium oxide (MAG-OX) 400 MG tablet Take 400 mg by mouth      metFORMIN ER (GLUCOPHAGE-XR) 500 MG XR tablet Take 1,000 mg by mouth 2 times daily   2    clopidogrel (PLAVIX) 75 MG tablet   2    BYDUREON 2 MG SUSR injection   5    fenofibrate (TRICOR) 145 MG tablet Take 145 mg by mouth daily.  metoprolol (LOPRESSOR) 50 MG tablet Take 50 mg by mouth every morning.  lisinopril (PRINIVIL;ZESTRIL) 2.5 MG tablet Take 2.5 mg by mouth daily.  spironolactone (ALDACTONE) 25 MG tablet Take 25 mg by mouth 2 times daily.  lovastatin (MEVACOR) 20 MG tablet Take 2 tablets by mouth daily.  Cholecalciferol (VITAMIN D PO) Take 3,000 Int'l Units by mouth daily       nitroGLYCERIN (NITROSTAT) 0.4 MG SL tablet Place 0.4 mg under the tongue every 5 minutes as needed.  aspirin 81 MG EC tablet Take 81 mg by mouth daily.         [DISCONTINUED] cyclobenzaprine (FLEXERIL) 5 MG tablet Take 1 tablet by mouth 3 times daily as needed for Muscle spasms 60 tablet 0    [DISCONTINUED] metoprolol (LOPRESSOR) 100 MG tablet 100 mg 2 times daily  3     Facility-Administered Encounter Medications as of 3/7/2018   Medication Dose Route Frequency Provider Last Rate Last Dose    lidocaine 2 % injection 1 mL  1 mL Intradermal Once Thomas Banuelos DO          wmc/flexeriltmj prn  Orders Placed This Encounter   Procedures    Hemoglobin A1C     This order was created through External Result Entry    Comprehensive Metabolic Panel     This order was created through External Result Entry    TSH without Reflex     This order was created through External Result Entry     Orders Placed This Encounter   Medications    cyclobenzaprine (FLEXERIL) 5 MG tablet     Sig: Take 1 tablet by mouth 3 times daily as needed for Muscle spasms     Dispense:  60 tablet     Refill:  0     Medications Discontinued During This Encounter   Medication Reason    metoprolol (LOPRESSOR) 100 MG tablet     cyclobenzaprine (FLEXERIL) 5 MG tablet

## 2018-03-14 ASSESSMENT — ENCOUNTER SYMPTOMS
NAUSEA: 0
SHORTNESS OF BREATH: 0
COUGH: 0
ABDOMINAL PAIN: 0

## 2018-05-08 DIAGNOSIS — M26.621 ARTHRALGIA OF RIGHT TEMPOROMANDIBULAR JOINT: ICD-10-CM

## 2018-05-08 RX ORDER — CYCLOBENZAPRINE HCL 5 MG
5 TABLET ORAL 3 TIMES DAILY PRN
Qty: 60 TABLET | Refills: 2 | Status: SHIPPED | OUTPATIENT
Start: 2018-05-08 | End: 2018-12-11 | Stop reason: SDUPTHER

## 2018-06-06 ENCOUNTER — OFFICE VISIT (OUTPATIENT)
Dept: FAMILY MEDICINE CLINIC | Age: 53
End: 2018-06-06
Payer: COMMERCIAL

## 2018-06-06 VITALS
HEIGHT: 67 IN | WEIGHT: 211 LBS | TEMPERATURE: 95.8 F | BODY MASS INDEX: 33.12 KG/M2 | RESPIRATION RATE: 14 BRPM | SYSTOLIC BLOOD PRESSURE: 116 MMHG | DIASTOLIC BLOOD PRESSURE: 78 MMHG | HEART RATE: 75 BPM

## 2018-06-06 DIAGNOSIS — E03.9 ACQUIRED HYPOTHYROIDISM: ICD-10-CM

## 2018-06-06 DIAGNOSIS — Z12.39 BREAST CANCER SCREENING: ICD-10-CM

## 2018-06-06 DIAGNOSIS — C85.81 LARGE CELL LYMPHOMA OF LYMPH NODES OF NECK (HCC): ICD-10-CM

## 2018-06-06 DIAGNOSIS — E78.5 DYSLIPIDEMIA: Primary | ICD-10-CM

## 2018-06-06 PROCEDURE — 99213 OFFICE O/P EST LOW 20 MIN: CPT | Performed by: FAMILY MEDICINE

## 2018-06-06 RX ORDER — SPIRONOLACTONE 25 MG/1
25 TABLET ORAL
COMMUNITY
Start: 2018-03-14

## 2018-06-06 RX ORDER — GLIMEPIRIDE 4 MG/1
TABLET ORAL
COMMUNITY
Start: 2018-05-11 | End: 2018-10-26 | Stop reason: ALTCHOICE

## 2018-06-06 RX ORDER — METOPROLOL TARTRATE 100 MG/1
TABLET ORAL
Refills: 2 | COMMUNITY
Start: 2018-06-01

## 2018-06-06 RX ORDER — PIOGLITAZONEHYDROCHLORIDE 30 MG/1
30 TABLET ORAL
COMMUNITY
Start: 2018-03-14

## 2018-06-06 ASSESSMENT — PATIENT HEALTH QUESTIONNAIRE - PHQ9
SUM OF ALL RESPONSES TO PHQ9 QUESTIONS 1 & 2: 0
SUM OF ALL RESPONSES TO PHQ QUESTIONS 1-9: 0
1. LITTLE INTEREST OR PLEASURE IN DOING THINGS: 0
2. FEELING DOWN, DEPRESSED OR HOPELESS: 0

## 2018-06-13 ASSESSMENT — ENCOUNTER SYMPTOMS
SHORTNESS OF BREATH: 0
NAUSEA: 0
ABDOMINAL PAIN: 0

## 2018-08-16 ENCOUNTER — HOSPITAL ENCOUNTER (OUTPATIENT)
Dept: WOMENS IMAGING | Age: 53
Discharge: HOME | End: 2018-08-18
Payer: COMMERCIAL

## 2018-08-16 DIAGNOSIS — Z12.39 BREAST CANCER SCREENING: ICD-10-CM

## 2018-08-16 PROCEDURE — 77067 SCR MAMMO BI INCL CAD: CPT

## 2018-10-16 ENCOUNTER — OFFICE VISIT (OUTPATIENT)
Dept: FAMILY MEDICINE CLINIC | Age: 53
End: 2018-10-16
Payer: COMMERCIAL

## 2018-10-16 VITALS
BODY MASS INDEX: 32.58 KG/M2 | OXYGEN SATURATION: 98 % | TEMPERATURE: 96.9 F | RESPIRATION RATE: 18 BRPM | HEART RATE: 80 BPM | SYSTOLIC BLOOD PRESSURE: 122 MMHG | DIASTOLIC BLOOD PRESSURE: 68 MMHG | WEIGHT: 208 LBS

## 2018-10-16 DIAGNOSIS — H60.01 ABSCESS, EARLOBE, RIGHT: Primary | ICD-10-CM

## 2018-10-16 PROCEDURE — 10060 I&D ABSCESS SIMPLE/SINGLE: CPT | Performed by: INTERNAL MEDICINE

## 2018-10-16 PROCEDURE — 99213 OFFICE O/P EST LOW 20 MIN: CPT | Performed by: INTERNAL MEDICINE

## 2018-10-16 RX ORDER — CLINDAMYCIN HYDROCHLORIDE 300 MG/1
300 CAPSULE ORAL 3 TIMES DAILY
Qty: 15 CAPSULE | Refills: 0 | Status: SHIPPED | OUTPATIENT
Start: 2018-10-16 | End: 2018-10-21

## 2018-10-16 ASSESSMENT — ENCOUNTER SYMPTOMS
FACIAL SWELLING: 0
COUGH: 0
DIARRHEA: 0
ABDOMINAL PAIN: 0
NAUSEA: 0
SORE THROAT: 0
VOMITING: 0
SINUS PAIN: 0
SHORTNESS OF BREATH: 0
RHINORRHEA: 0
WHEEZING: 0
SINUS PRESSURE: 0

## 2018-10-16 NOTE — PROGRESS NOTES
Subjective:      Patient ID: Emi Carrel is a 52 y.o. female    Cyst on the back of the rigth ear x 3 days    HPI    Pt presents with 3 days of painful cyst and redness on the back of the right ear. The swelling had been for 2 months but increased in size 3 days ago. No fever or chills, no ear pain or discharge. Hx NHL, 6 years in remission. Past Medical History:   Diagnosis Date    CAD (coronary artery disease)     Cough variant asthma     Diabetes mellitus, type 2 ([facility] [address]     Diffuse large B cell lymphoma ([facility] [address]     Dyslipidemia     Hypertension     Metatarsalgia of right foot     Palpitations     Perennial allergic rhinitis      Past Surgical History:   Procedure Laterality Date    CARDIAC CATHETERIZATION  2010    CARDIAC CATHETERIZATION  03/06/14    DR Mackenzie Barker CHOLECYSTECTOMY  2008    PTCA       Social History     Social History    Marital status:      Spouse name: N/A    Number of children: N/A    Years of education: N/A     Occupational History    Not on file.      Social History Main Topics    Smoking status: Never Smoker    Smokeless tobacco: Never Used    Alcohol use Yes     2 Glasses of wine per week    Drug use: No    Sexual activity: Not on file     Other Topics Concern    Not on file     Social History Narrative    No narrative on file     Family History   Problem Relation Age of Onset    Cancer Mother     High Blood Pressure Mother     High Cholesterol Father     Heart Disease Father         ARRYTHMIA, PVC'S     Allergies:  Cefdinir; Codeine; Darvocet [propoxyphene n-acetaminophen]; Sulfa antibiotics; and Tetracyclines & related  Patient Active Problem List   Diagnosis    Diabetes mellitus, type 2 ([facility] [address]    Dyslipidemia    CAD (coronary artery disease)    Palpitations    Hypertension    Enthesopathy of hip region    Thoracic spondylosis without myelopathy    Acquired hypothyroidism    Other specified endocrine disorders    Dysmetabolic syndrome X    Orders Placed This Encounter   Procedures    [address] - [place] DRAIN SKIN ABSCESS SIMPLE     Orders Placed This Encounter   Medications    clindamycin (CLEOCIN) 300 MG capsule     Sig: Take 1 capsule by mouth 3 times daily for 5 days     Dispense:  15 capsule     Refill:  0       Return if symptoms worsen or fail to improve.

## 2018-10-26 ENCOUNTER — OFFICE VISIT (OUTPATIENT)
Dept: FAMILY MEDICINE CLINIC | Age: 53
End: 2018-10-26
Payer: COMMERCIAL

## 2018-10-26 DIAGNOSIS — I49.3 SYMPTOMATIC PVCS: ICD-10-CM

## 2018-10-26 DIAGNOSIS — E03.9 ACQUIRED HYPOTHYROIDISM: ICD-10-CM

## 2018-10-26 DIAGNOSIS — R00.2 PALPITATIONS: ICD-10-CM

## 2018-10-26 DIAGNOSIS — J45.20 MILD INTERMITTENT ASTHMA WITHOUT COMPLICATION: Primary | ICD-10-CM

## 2018-10-26 PROCEDURE — 99213 OFFICE O/P EST LOW 20 MIN: CPT | Performed by: FAMILY MEDICINE

## 2018-10-26 NOTE — PROGRESS NOTES
60 MG extended release tablet TAKE ONE TABLET BY MOUTH EVERY DAY  3    diltiazem (CARDIZEM 12 HR) 120 MG extended release capsule Take 120 mg by mouth 2 times daily      levothyroxine (SYNTHROID) 88 MCG tablet   10    vitamin B-12 (CYANOCOBALAMIN) 1000 MCG tablet Take 1,000 mcg by mouth daily      magnesium oxide (MAG-OX) 400 MG tablet Take 400 mg by mouth      metFORMIN ER (GLUCOPHAGE-XR) 500 MG XR tablet Take 1,000 mg by mouth 2 times daily   2    clopidogrel (PLAVIX) 75 MG tablet   2    fenofibrate (TRICOR) 145 MG tablet Take 145 mg by mouth daily.  lisinopril (PRINIVIL;ZESTRIL) 2.5 MG tablet Take 2.5 mg by mouth daily.  Cholecalciferol (VITAMIN D PO) Take 3,000 Int'l Units by mouth daily       nitroGLYCERIN (NITROSTAT) 0.4 MG SL tablet Place 0.4 mg under the tongue every 5 minutes as needed.  aspirin 81 MG EC tablet Take 81 mg by mouth daily.  [DISCONTINUED] glimepiride (AMARYL) 4 MG tablet 1/2 tab twice/day with meals      [DISCONTINUED] insulin glargine (LANTUS) 100 UNIT/ML injection pen inject  4 units each morning      lovastatin (MEVACOR) 20 MG tablet Take 2 tablets by mouth daily. Facility-Administered Encounter Medications as of 10/26/2018   Medication Dose Route Frequency Provider Last Rate Last Dose    lidocaine 2 % injection 1 mL  1 mL Intradermal Once Sally Wagner, DO          ; If worsening chest pain/weakness/ or SOB, get to ER; call 911 if severe or rapidly worsening symptoms. No orders of the defined types were placed in this encounter. No orders of the defined types were placed in this encounter. Medications Discontinued During This Encounter   Medication Reason    glimepiride (AMARYL) 4 MG tablet Therapy completed    insulin glargine (LANTUS) 100 UNIT/ML injection pen Therapy completed     Return in about 3 months (around 1/26/2019) for gen. Call or return to clinic prn if these symptoms worsen or fail to improve as anticipated.       Perez Hernández

## 2018-11-04 VITALS
HEART RATE: 48 BPM | HEIGHT: 67 IN | RESPIRATION RATE: 14 BRPM | DIASTOLIC BLOOD PRESSURE: 66 MMHG | TEMPERATURE: 96.9 F | WEIGHT: 212 LBS | SYSTOLIC BLOOD PRESSURE: 104 MMHG | BODY MASS INDEX: 33.27 KG/M2

## 2018-11-04 PROBLEM — J45.20 MILD INTERMITTENT ASTHMA WITHOUT COMPLICATION: Status: ACTIVE | Noted: 2018-11-04

## 2018-11-04 ASSESSMENT — ENCOUNTER SYMPTOMS
NAUSEA: 0
SHORTNESS OF BREATH: 0
RHINORRHEA: 1
CHEST TIGHTNESS: 0
SINUS PAIN: 0
SINUS PRESSURE: 0
ABDOMINAL PAIN: 0
STRIDOR: 0
COUGH: 0

## 2019-03-04 PROBLEM — Z85.72 PERSONAL HISTORY OF NON-HODGKIN LYMPHOMAS: Status: ACTIVE | Noted: 2019-03-04

## 2019-03-19 ENCOUNTER — OFFICE VISIT (OUTPATIENT)
Dept: FAMILY MEDICINE CLINIC | Age: 54
End: 2019-03-19
Payer: COMMERCIAL

## 2019-03-19 VITALS
TEMPERATURE: 98.6 F | HEIGHT: 67 IN | SYSTOLIC BLOOD PRESSURE: 114 MMHG | HEART RATE: 80 BPM | WEIGHT: 208 LBS | BODY MASS INDEX: 32.65 KG/M2 | RESPIRATION RATE: 14 BRPM | DIASTOLIC BLOOD PRESSURE: 68 MMHG

## 2019-03-19 DIAGNOSIS — E78.5 DYSLIPIDEMIA: ICD-10-CM

## 2019-03-19 DIAGNOSIS — J45.20 MILD INTERMITTENT ASTHMA WITHOUT COMPLICATION: Primary | ICD-10-CM

## 2019-03-19 DIAGNOSIS — I49.3 SYMPTOMATIC PVCS: ICD-10-CM

## 2019-03-19 DIAGNOSIS — Z85.72 HX OF LYMPHOMA, NON-HODGKINS: ICD-10-CM

## 2019-03-19 DIAGNOSIS — Z12.39 SCREENING FOR MALIGNANT NEOPLASM OF BREAST: ICD-10-CM

## 2019-03-19 PROCEDURE — 99213 OFFICE O/P EST LOW 20 MIN: CPT | Performed by: FAMILY MEDICINE

## 2019-03-19 RX ORDER — CYCLOBENZAPRINE HCL 5 MG
TABLET ORAL
Qty: 60 TABLET | Refills: 1 | Status: SHIPPED | OUTPATIENT
Start: 2019-03-19

## 2019-03-24 ASSESSMENT — ENCOUNTER SYMPTOMS
ABDOMINAL PAIN: 0
COUGH: 0
BLOOD IN STOOL: 0
SHORTNESS OF BREATH: 0
NAUSEA: 0
SINUS PRESSURE: 0

## 2021-06-03 ENCOUNTER — TELEPHONE (OUTPATIENT)
Dept: PAIN MANAGEMENT | Age: 56
End: 2021-06-03

## 2021-06-07 ENCOUNTER — PROCEDURE VISIT (OUTPATIENT)
Dept: PAIN MANAGEMENT | Age: 56
End: 2021-06-07
Payer: COMMERCIAL

## 2021-06-07 DIAGNOSIS — R20.0 LEFT LEG NUMBNESS: ICD-10-CM

## 2021-06-07 DIAGNOSIS — M47.817 LUMBOSACRAL SPONDYLOSIS WITHOUT MYELOPATHY: ICD-10-CM

## 2021-06-07 PROCEDURE — 64493 INJ PARAVERT F JNT L/S 1 LEV: CPT | Performed by: PAIN MEDICINE

## 2021-06-07 PROCEDURE — 64495 INJ PARAVERT F JNT L/S 3 LEV: CPT | Performed by: PAIN MEDICINE

## 2021-06-07 PROCEDURE — 64494 INJ PARAVERT F JNT L/S 2 LEV: CPT | Performed by: PAIN MEDICINE

## 2021-06-07 RX ORDER — LIDOCAINE HYDROCHLORIDE 10 MG/ML
2 INJECTION, SOLUTION EPIDURAL; INFILTRATION; INTRACAUDAL; PERINEURAL ONCE
Status: COMPLETED | OUTPATIENT
Start: 2021-06-07 | End: 2021-06-07

## 2021-06-07 RX ORDER — BETAMETHASONE SODIUM PHOSPHATE AND BETAMETHASONE ACETATE 3; 3 MG/ML; MG/ML
6 INJECTION, SUSPENSION INTRA-ARTICULAR; INTRALESIONAL; INTRAMUSCULAR; SOFT TISSUE ONCE
Status: COMPLETED | OUTPATIENT
Start: 2021-06-07 | End: 2021-06-07

## 2021-06-07 RX ADMIN — BETAMETHASONE SODIUM PHOSPHATE AND BETAMETHASONE ACETATE 6 MG: 3; 3 INJECTION, SUSPENSION INTRA-ARTICULAR; INTRALESIONAL; INTRAMUSCULAR; SOFT TISSUE at 09:08

## 2021-06-07 RX ADMIN — LIDOCAINE HYDROCHLORIDE 2 ML: 10 INJECTION, SOLUTION EPIDURAL; INFILTRATION; INTRACAUDAL; PERINEURAL at 09:07

## 2021-06-07 NOTE — PROGRESS NOTES
[facility]) Physicians  Neurosurgery and Pain [facility]  [address], [address] [facility] , [address] [facility], [facility] [address]: [phone]  F: [phone]       LUMBAR FACET JOINT INJECTION       Provider: Agustin Osei DO          Patient Name: Sophia Alfonso : 1965        Date: 2021        Sophia Alfonso is here today for interventional pain management. Preoperatively, the patient presents with facet joint mediated pain as per history and exam. Patient has failed NSAIDs, PT, and conservative treatment. Patient has significant psychological and functional impairment due to these conditions. Standard ASI guidelines were followed and sterile technique used. Area was cleaned with Betadine x3. Informed consent was obtained. Fluoroscopic guidance was used for this procedure. Appropriate oblique views were used to open up the facet joints. Appropriate length spinal needle was advanced to each facet joint. Negative aspiration was achieved. Approximately 6mg Celestone was divided equally and 0.5 cc of 1% preservative free Lidocaine was injected in the joints injected without difficulty. Patient tolerated the procedure well, no obvious complications occurred during the procedure. Patient was appropriately monitored and discharged home in stable condition with their usual motor strength. Post Op instructions were given to patient. Patient told to resume blood thinners if they stopped them prior to procedure. Relevent and recent imaging reviewed with patient today.            [x] Bilateral [] T12-L1       [] L1-2      [] Right [] L2-3       [x] L3-4      [] Left [x] L4-5         [x] L5-S1                           Physician: Agustin Osei DO

## 2021-06-09 ENCOUNTER — HOSPITAL ENCOUNTER (OUTPATIENT)
Dept: MRI IMAGING | Age: 56
Discharge: HOME | End: 2021-06-11
Payer: COMMERCIAL

## 2021-06-09 DIAGNOSIS — R20.0 LEFT LEG NUMBNESS: ICD-10-CM

## 2021-06-09 DIAGNOSIS — M47.817 LUMBOSACRAL SPONDYLOSIS WITHOUT MYELOPATHY: ICD-10-CM

## 2021-06-09 PROCEDURE — 72148 MRI LUMBAR SPINE W/O DYE: CPT

## 2021-06-17 ENCOUNTER — OFFICE VISIT (OUTPATIENT)
Dept: PAIN MANAGEMENT | Age: 56
End: 2021-06-17
Payer: COMMERCIAL

## 2021-06-17 VITALS — HEIGHT: 67 IN | WEIGHT: 200 LBS | TEMPERATURE: 96.8 F | BODY MASS INDEX: 31.39 KG/M2

## 2021-06-17 DIAGNOSIS — M47.817 LUMBOSACRAL SPONDYLOSIS WITHOUT MYELOPATHY: ICD-10-CM

## 2021-06-17 DIAGNOSIS — R20.0 LEFT LEG NUMBNESS: ICD-10-CM

## 2021-06-17 DIAGNOSIS — M54.16 LUMBAR RADICULOPATHY: Primary | ICD-10-CM

## 2021-06-17 PROCEDURE — 99214 OFFICE O/P EST MOD 30 MIN: CPT | Performed by: NURSE PRACTITIONER

## 2021-06-17 RX ORDER — MELOXICAM 15 MG/1
15 TABLET ORAL DAILY
Qty: 30 TABLET | Refills: 0 | Status: SHIPPED | OUTPATIENT
Start: 2021-06-17 | End: 2021-07-17

## 2021-06-17 ASSESSMENT — ENCOUNTER SYMPTOMS
SHORTNESS OF BREATH: 0
SORE THROAT: 0
ABDOMINAL PAIN: 0
BACK PAIN: 1

## 2021-06-17 NOTE — PROGRESS NOTES
Johanna Suggs  (1965)    6/17/2021    Subjective:     Johanna Suggs is 60 y.o. female who complains today of:    Chief Complaint   Patient presents with    Back Pain     Lower         Allergies:  Cefdinir, Codeine, Darvocet [propoxyphene n-acetaminophen], Sulfa antibiotics, and Tetracyclines & related    Past Medical History:   Diagnosis Date    CAD (coronary artery disease)     Cough variant asthma     [facility] mellitus, [facility] 2 [facility]     Diffuse large B cell lymphoma ([facility]     Dyslipidemia     Eczema     Hypertension     Hypothyroidism     Metatarsalgia of right foot     Palpitations     Perennial allergic rhinitis      Past Surgical History:   Procedure Laterality Date    CARDIAC CATHETERIZATION  2010    CARDIAC CATHETERIZATION  03/06/14    DR [facility] CHOLECYSTECTOMY  2008    CORONARY ANGIOPLASTY WITH STENT PLACEMENT      x2    OTHER SURGICAL HISTORY Right     Breast cyst    PTCA      TONSILLECTOMY       Family History   Problem Relation Age of Onset    Cancer Mother         ampullary    High Blood Pressure Mother     High Cholesterol Father     Heart Disease Father         ARRYTHMIA, PVC'S     Social History     Socioeconomic History    Marital status:      Spouse name: Not on file    Number of children: 1    Years of education: Not on file    Highest education level: Not on file   Occupational History    Occupation: Employed -    Tobacco Use    Smoking status: Never Smoker    Smokeless tobacco: Never Used   Substance and Sexual Activity    Alcohol use: Yes     Types: 2 Glasses of wine per week     Comment: occasional    Drug use: No    Sexual activity: Not on file   Other Topics Concern    Not on file   Social History Narrative    Not on file     Social Determinants of Health     Financial Resource Strain:     Difficulty of Paying Living Expenses:    Food Insecurity:     Worried About Running Out of Food in the Last Year:     Karolyn murphy Food in the Last Year:    Transportation Needs:     Lack of Transportation (Medical):      Lack of Transportation (Non-Medical):    Physical Activity:     Days of Exercise per Week:     Minutes of Exercise per Session:    Stress:     Feeling of Stress :    Social Connections:     Frequency of Communication with Friends and Family:     Frequency of Social Gatherings with Friends and Family:     Attends [employer] Services:     Active Member of Clubs or Organizations:     Attends Club or Organization Meetings:     Marital Status:    Intimate Partner Violence:     Fear of Current or Ex-Partner:     Emotionally Abused:     Physically Abused:     Sexually Abused:        Current Outpatient Medications on File Prior to Visit   Medication Sig Dispense Refill    cyclobenzaprine (FLEXERIL) 5 MG tablet TAKE ONE TABLET BY MOUTH THREE TIMES A DAY AS NEEDED for muscle spasms 60 tablet 1    pioglitazone (ACTOS) 30 MG tablet Take 30 mg by mouth      Dulaglutide 1.5 MG/0.5ML SOPN Once weekly      metoprolol (LOPRESSOR) 100 MG tablet   2    spironolactone (ALDACTONE) 25 MG tablet Take 25 mg by mouth       fluticasone (FLONASE) 50 MCG/ACT nasal spray 2 sprays by Nasal route daily 1 Bottle 5    B-D ULTRAFINE III SHORT PEN 31G X 8 MM MISC Use twice daily  2    albuterol sulfate  (90 BASE) MCG/ACT inhaler Inhale 2 puffs into the lungs every 6 hours as needed for Wheezing 1 Inhaler 3    isosorbide mononitrate (IMDUR) 60 MG extended release tablet TAKE ONE TABLET BY MOUTH EVERY DAY  3    diltiazem (CARDIZEM 12 HR) 120 MG extended release capsule Take 120 mg by mouth 2 times daily      levothyroxine (SYNTHROID) 88 MCG tablet   10    vitamin B-12 (CYANOCOBALAMIN) 1000 MCG tablet Take 1,000 mcg by mouth daily      magnesium oxide (MAG-OX) 400 MG tablet Take 400 mg by mouth      metFORMIN ER (GLUCOPHAGE-XR) 500 MG XR tablet Take 1,000 mg by mouth 2 times daily   2    clopidogrel (PLAVIX) 75 MG tablet   2    fenofibrate (TRICOR) 145 MG tablet Take 145 mg by mouth daily.  lisinopril (PRINIVIL;ZESTRIL) 2.5 MG tablet Take 2.5 mg by mouth daily.  lovastatin (MEVACOR) 20 MG tablet Take 2 tablets by mouth daily.  Cholecalciferol (VITAMIN D PO) Take 3,000 Int'l Units by mouth daily       nitroGLYCERIN (NITROSTAT) 0.4 MG SL tablet Place 0.4 mg under the tongue every 5 minutes as needed.  aspirin 81 MG EC tablet Take 81 mg by mouth daily. Current Facility-Administered Medications on File Prior to Visit   Medication Dose Route Frequency Provider Last Rate Last Admin    lidocaine 2 % injection 1 mL  1 mL Intradermal Once Daniel Weston,                Pt presents today for a f/u of chronic low back pain. Recent facet injections did not help. Pt feels pain level and functioning improves with prescribed medications and is able to walk longer. Continues Ibuprofen OTC. Pt feels that bending and laying down aggravates the pain. She did 6 sessions of PT which has improved her flexibility but now also having more left leg numbness. She hurt her back shoveling snow in February and has been getting worse. She has been allowed to come off Plavix (history stents), hopefully will not have to go back on and is able to take some NSAIDS. Also taking Flexeril. She is a diabetic. She has a history of Non Hodgkins 5 years ago and is in remission. Denies back surgery.      MRI from 2013 report shows: MULTILEVEL BROAD-BASED DISK PROTRUSIONS FROM L2-3 THROUGH L4-5 MOST PROMINENT AT L2-3 WITH MILD-MODERATE CENTRAL CANAL NARROWING AT THIS LEVEL. Lumbar MRI 6/11/21 L2-3 moderate canal stenosis and mild BL foraminal stenosis; L3-4 mild canal and foraminal stenosis; L4-5 disc protrusion with displacement of left L5 nerve root and compromise of left L4 nerve root & mild canal stenosis. 6/7/21 BL L3-4, 4-5, 5-S1 facet inj          Review of Systems   Constitutional: Negative for fever.    HENT: Negative for congestion and sore throat. Respiratory: Negative for shortness of breath. Gastrointestinal: Negative for abdominal pain. Genitourinary: Negative for difficulty urinating. Musculoskeletal: Positive for back pain. Neurological: Negative for speech difficulty and headaches. Hematological: Negative for adenopathy. Psychiatric/Behavioral: Negative for agitation. All other systems reviewed and are negative. Objective:     Vitals:  Temp 96.8 °F (36 °C)   Ht 5' 7\" (1.702 m)   Wt 200 lb (90.7 kg)   BMI 31.32 kg/m² Pain Score:   5      Physical Exam  Vitals and nursing note reviewed. Pt is alert and oriented x 3. Recent and remote memory is intact. Mood, judgement and affect are normal.  No signs of distress or SOB noted. Visualized skin intact. Sensation intact to light touch. Decreased ROM with flexion and extension of low back. Tender with palpation to bilateral lumbar spine with positive provacative maneuvers noted. Positive left SLR. Pt is able to briefly heel walk and toe walk. Strength, balance, and coordination are functional for ambulation. Assessment:      Diagnosis Orders   1. Lumbar radiculopathy  RI NJX AA&/STRD TFRML EPI LUMBAR/SACRAL 1 LEVEL    RI NJX AA&/STRD TFRML EPI LUMBAR/SACRAL EA ADDL   2. Lumbosacral spondylosis without myelopathy     3. Left leg numbness         Plan:          Orders Placed This Encounter   Medications    meloxicam (MOBIC) 15 MG tablet     Sig: Take 1 tablet by mouth daily     Dispense:  30 tablet     Refill:  0       Orders Placed This Encounter   Procedures    RI NJX AA&/STRD TFRML EPI LUMBAR/SACRAL 1 LEVEL     Left L4 & L5 RONIT with SK     Standing Status:   Future     Standing Expiration Date:   9/15/2021    RI NJX AA&/STRD TFRML EPI LUMBAR/SACRAL EA ADDL     Left L4 & L5 RONIT with SK     Standing Status:   Future     Standing Expiration Date:   9/15/2021     Discussed options with the patient today.  Facet injections did not help. MRI reviewed in detail with patient. Has completed 6 sessions of PT, with worsening left leg N/T. Will start Meloxicam for pain. Will get Left L4& L5 RONIT for her radicular pain.  She will schedule f/u with Dr. Susy Oliver and Dr. Eulogio Loomis for 2nd opinion. Consider Gabapentin for future if RONIT is not helpful. All questions were answered.  Pt verbalized understanding and agrees with above plan. Relevant imaging reviewed.            Will continue medications for chronic pain as they do help pt function with ADL and improve quality of life.   This NP saw pt under direct supervision of Dr. Damian Chawla. Follow up:  Return in about 4 weeks (around 7/15/2021) for review meds and reassess pain.     Radhika Mccauley, APRN - CNP

## 2021-06-18 ENCOUNTER — TELEPHONE (OUTPATIENT)
Dept: PAIN MANAGEMENT | Age: 56
End: 2021-06-18

## 2021-06-18 NOTE — TELEPHONE ENCOUNTER
ORDER PLACED:    Date: 06/17/21  Description: LEFT L4, L5 RONIT  Order Number: [id number]  Ordering Provider: FAITH  Performing Provider: ANISH  CPT Codes: 45947  ICD10 Codes: M54.16

## 2021-06-18 NOTE — TELEPHONE ENCOUNTER
LEFT L4, L5 RONIT    OK to schedule procedure approved as above. Please note sides/levels approved and date range.    (If applicable, sides/levels approved may differ from those ordered)      [address] [place] [address] Dr Arredondo

## 2021-06-18 NOTE — TELEPHONE ENCOUNTER
BENEFITS:    Insurance: DAT  Phone: [phone]  Contact Name: Reymundo Valentin Date: 8-     Plan year: CALENDER  Deductible: $700     Deductible Met: $700  Allowed/benefits paid at: 80% AFTER DED  OOP: $5700 MET:$2726.95  Freq Limits: 6 PER YEAR  Prior Auth Requirement: NO AUTH REQUIRED    Notes:     Call Reference #: [id number]    Time of call: 3:15PM

## 2021-08-04 ENCOUNTER — HOSPITAL ENCOUNTER (OUTPATIENT)
Dept: MRI IMAGING | Age: 56
Discharge: HOME | End: 2021-08-06
Payer: COMMERCIAL

## 2021-08-04 DIAGNOSIS — M51.24 DISPLACEMENT OF THORACIC INTERVERTEBRAL DISC WITHOUT MYELOPATHY: ICD-10-CM

## 2021-08-04 PROCEDURE — 72146 MRI CHEST SPINE W/O DYE: CPT

## 2021-08-17 ENCOUNTER — TELEPHONE (OUTPATIENT)
Dept: PAIN MANAGEMENT | Age: 56
End: 2021-08-17

## 2021-08-17 NOTE — TELEPHONE ENCOUNTER
Patient called wanting to let Dr. Melody Gonzalez know that she went to see Dr. Sawyer Montalvo who is recommending patient have a  L 2,3 L 4,5 T 11,12 diskectomy. She is going to go ahead with the surgery.

## 2023-05-02 ENCOUNTER — OFFICE VISIT (OUTPATIENT)
Dept: PRIMARY CARE | Facility: CLINIC | Age: 58
End: 2023-05-02
Payer: COMMERCIAL

## 2023-05-02 VITALS
DIASTOLIC BLOOD PRESSURE: 70 MMHG | SYSTOLIC BLOOD PRESSURE: 112 MMHG | OXYGEN SATURATION: 98 % | HEIGHT: 66 IN | HEART RATE: 83 BPM | WEIGHT: 200 LBS | RESPIRATION RATE: 16 BRPM | TEMPERATURE: 96.4 F | BODY MASS INDEX: 32.14 KG/M2

## 2023-05-02 DIAGNOSIS — L30.9 ECZEMA OF HAND: ICD-10-CM

## 2023-05-02 DIAGNOSIS — E78.2 MIXED HYPERLIPIDEMIA: ICD-10-CM

## 2023-05-02 DIAGNOSIS — J01.01 ACUTE RECURRENT MAXILLARY SINUSITIS: ICD-10-CM

## 2023-05-02 DIAGNOSIS — I10 BENIGN ESSENTIAL HYPERTENSION: Primary | ICD-10-CM

## 2023-05-02 DIAGNOSIS — R00.2 PALPITATIONS: ICD-10-CM

## 2023-05-02 DIAGNOSIS — E03.9 ACQUIRED HYPOTHYROIDISM: ICD-10-CM

## 2023-05-02 PROBLEM — Z98.890 STATUS POST LUMBAR SPINE SURGERY FOR DECOMPRESSION OF SPINAL CORD: Status: ACTIVE | Noted: 2023-05-02

## 2023-05-02 PROBLEM — E78.5 DYSLIPIDEMIA: Status: ACTIVE | Noted: 2023-05-02

## 2023-05-02 PROBLEM — R35.89 POLYURIA: Status: ACTIVE | Noted: 2023-05-02

## 2023-05-02 PROBLEM — J30.1 SEASONAL ALLERGIC RHINITIS DUE TO POLLEN: Status: ACTIVE | Noted: 2023-05-02

## 2023-05-02 PROBLEM — M26.621 ARTHRALGIA OF RIGHT TEMPOROMANDIBULAR JOINT: Status: ACTIVE | Noted: 2018-01-10

## 2023-05-02 PROBLEM — I20.9 ANGINA, CLASS III (CMS-HCC): Status: ACTIVE | Noted: 2023-05-02

## 2023-05-02 PROBLEM — J45.20 MILD INTERMITTENT ASTHMA WITHOUT COMPLICATION (HHS-HCC): Status: ACTIVE | Noted: 2018-11-04

## 2023-05-02 PROBLEM — E11.9 DIABETES MELLITUS, TYPE 2 (MULTI): Status: ACTIVE | Noted: 2023-05-02

## 2023-05-02 PROBLEM — E04.2 NONTOXIC MULTINODULAR GOITER: Status: ACTIVE | Noted: 2017-11-14

## 2023-05-02 PROBLEM — J32.9 CHRONIC SINUSITIS: Status: ACTIVE | Noted: 2023-05-02

## 2023-05-02 PROBLEM — K11.20 PAROTIDITIS: Status: ACTIVE | Noted: 2018-01-10

## 2023-05-02 PROBLEM — Z85.72 HISTORY OF NON-HODGKIN'S LYMPHOMA: Status: ACTIVE | Noted: 2019-03-04

## 2023-05-02 PROBLEM — M54.16 LUMBAR RADICULOPATHY: Status: ACTIVE | Noted: 2023-05-02

## 2023-05-02 PROBLEM — I49.3 FREQUENT UNIFOCAL PVCS: Status: ACTIVE | Noted: 2023-05-02

## 2023-05-02 PROBLEM — I87.2 VENOUS INSUFFICIENCY (CHRONIC) (PERIPHERAL): Status: ACTIVE | Noted: 2023-05-02

## 2023-05-02 PROBLEM — B37.9 YEAST INFECTION: Status: ACTIVE | Noted: 2023-05-02

## 2023-05-02 PROBLEM — J45.909 ASTHMA, ACUTE (HHS-HCC): Status: ACTIVE | Noted: 2023-05-02

## 2023-05-02 PROBLEM — R00.0 TACHYCARDIA: Status: ACTIVE | Noted: 2023-05-02

## 2023-05-02 PROBLEM — M48.062 LUMBAR STENOSIS WITH NEUROGENIC CLAUDICATION: Status: ACTIVE | Noted: 2023-05-02

## 2023-05-02 PROBLEM — M51.24 HERNIATION OF THORACIC INTERVERTEBRAL DISC WITHOUT MYELOPATHY: Status: ACTIVE | Noted: 2023-05-02

## 2023-05-02 PROCEDURE — 1036F TOBACCO NON-USER: CPT | Performed by: FAMILY MEDICINE

## 2023-05-02 PROCEDURE — 3074F SYST BP LT 130 MM HG: CPT | Performed by: FAMILY MEDICINE

## 2023-05-02 PROCEDURE — 3078F DIAST BP <80 MM HG: CPT | Performed by: FAMILY MEDICINE

## 2023-05-02 PROCEDURE — 4010F ACE/ARB THERAPY RXD/TAKEN: CPT | Performed by: FAMILY MEDICINE

## 2023-05-02 PROCEDURE — 99214 OFFICE O/P EST MOD 30 MIN: CPT | Performed by: FAMILY MEDICINE

## 2023-05-02 RX ORDER — ACETAMINOPHEN, DEXTROMETHORPHAN HBR, DOXYLAMINE SUCCINATE, PHENYLEPHRINE HCL 650; 20; 12.5; 1 MG/30ML; MG/30ML; MG/30ML; MG/30ML
1 SOLUTION ORAL DAILY
COMMUNITY

## 2023-05-02 RX ORDER — CHOLECALCIFEROL (VITAMIN D3) 50 MCG
2000 TABLET ORAL DAILY
COMMUNITY

## 2023-05-02 RX ORDER — MELOXICAM 15 MG/1
1 TABLET ORAL DAILY
COMMUNITY
Start: 2021-06-17 | End: 2023-05-02

## 2023-05-02 RX ORDER — PIOGLITAZONEHYDROCHLORIDE 30 MG/1
30 TABLET ORAL DAILY
COMMUNITY

## 2023-05-02 RX ORDER — DILTIAZEM HYDROCHLORIDE 120 MG/1
120 CAPSULE, EXTENDED RELEASE ORAL DAILY
COMMUNITY
Start: 2023-04-29

## 2023-05-02 RX ORDER — FLUTICASONE PROPIONATE 50 MCG
1 SPRAY, SUSPENSION (ML) NASAL DAILY
COMMUNITY
Start: 2018-05-08

## 2023-05-02 RX ORDER — METFORMIN HYDROCHLORIDE 500 MG/1
2 TABLET, EXTENDED RELEASE ORAL 2 TIMES DAILY
COMMUNITY
Start: 2015-11-10

## 2023-05-02 RX ORDER — LISINOPRIL 2.5 MG/1
2.5 TABLET ORAL DAILY
COMMUNITY
End: 2024-01-10 | Stop reason: SDUPTHER

## 2023-05-02 RX ORDER — TRIAMCINOLONE ACETONIDE 1 MG/G
1 OINTMENT TOPICAL 2 TIMES DAILY
Qty: 80 G | Refills: 3 | Status: SHIPPED | OUTPATIENT
Start: 2023-05-02

## 2023-05-02 RX ORDER — LEVOTHYROXINE SODIUM 88 UG/1
88 TABLET ORAL DAILY
COMMUNITY

## 2023-05-02 RX ORDER — AZITHROMYCIN 250 MG/1
TABLET, FILM COATED ORAL
Qty: 6 TABLET | Refills: 0 | Status: SHIPPED | OUTPATIENT
Start: 2023-05-02 | End: 2023-09-05 | Stop reason: ALTCHOICE

## 2023-05-02 RX ORDER — CETIRIZINE HYDROCHLORIDE 10 MG/1
10 TABLET ORAL DAILY
COMMUNITY

## 2023-05-02 RX ORDER — FENOFIBRATE 145 MG/1
1 TABLET, FILM COATED ORAL DAILY
COMMUNITY
Start: 2022-05-16 | End: 2023-11-30 | Stop reason: SDUPTHER

## 2023-05-02 RX ORDER — LANOLIN ALCOHOL/MO/W.PET/CERES
1 CREAM (GRAM) TOPICAL 2 TIMES DAILY
COMMUNITY

## 2023-05-02 RX ORDER — LOVASTATIN 20 MG/1
20 TABLET ORAL NIGHTLY
COMMUNITY

## 2023-05-02 RX ORDER — SODIUM FLUORIDE1.1%, POTASSIUM NITRATE 5% 5.8; 57.5 MG/ML; MG/ML
GEL, DENTIFRICE DENTAL
COMMUNITY
Start: 2022-11-14 | End: 2023-12-07 | Stop reason: WASHOUT

## 2023-05-02 RX ORDER — METOPROLOL TARTRATE 100 MG/1
100 TABLET ORAL 2 TIMES DAILY
COMMUNITY

## 2023-05-02 RX ORDER — DULAGLUTIDE 4.5 MG/.5ML
4.5 INJECTION, SOLUTION SUBCUTANEOUS
COMMUNITY
End: 2023-12-06 | Stop reason: ALTCHOICE

## 2023-05-02 RX ORDER — ASPIRIN 81 MG/1
1 TABLET ORAL DAILY
COMMUNITY

## 2023-05-02 RX ORDER — ALBUTEROL SULFATE 90 UG/1
2 AEROSOL, METERED RESPIRATORY (INHALATION) EVERY 4 HOURS PRN
COMMUNITY

## 2023-05-02 RX ORDER — SODIUM FLUORIDE 5 MG/G
GEL, DENTIFRICE DENTAL
COMMUNITY
Start: 2021-04-05

## 2023-05-02 RX ORDER — NITROGLYCERIN 0.4 MG/1
0.4 TABLET SUBLINGUAL EVERY 5 MIN PRN
COMMUNITY

## 2023-05-02 RX ORDER — CYCLOBENZAPRINE HCL 10 MG
10 TABLET ORAL 3 TIMES DAILY PRN
COMMUNITY
End: 2023-07-17 | Stop reason: SDUPTHER

## 2023-05-02 RX ORDER — SPIRONOLACTONE 25 MG/1
25 TABLET ORAL DAILY
COMMUNITY

## 2023-05-02 RX ORDER — TRIAMCINOLONE ACETONIDE 1 MG/G
1 OINTMENT TOPICAL 2 TIMES DAILY
COMMUNITY
Start: 2018-01-15 | End: 2023-05-02 | Stop reason: SDUPTHER

## 2023-05-02 RX ORDER — ISOSORBIDE MONONITRATE 60 MG/1
60 TABLET, EXTENDED RELEASE ORAL DAILY
COMMUNITY
End: 2023-10-25 | Stop reason: SDUPTHER

## 2023-05-02 NOTE — PROGRESS NOTES
Subjective   Patient ID: Israel Swartz is a 58 y.o. female who presents for Sinusitis (4 month follow up ).  HPI  Is an 58-year-old female with a history of palpitations current deficiency hypertension dyslipidemia type 2 diabetes is here to recheck allergy rhinitis.  Patient is at increasing bilateral ear pressure right maxillary sinus and right retro-orbital pain.  I did review her chronic medicines does take 2 antilipid therapy in addition to her beta-blocker aspirin and antihypertensive medicines.  His breathing is been stable takes albuterol if needed also takes 88 mcg of Synthroid daily.  Did review recent  chemistry lipid TSH A1c which been stable very minimally elevated A1c.  Does follow endocrinology and is on Trulicity Actos metformin.  Does follow with cardiology and has been stable with fewer palpitations.  Otherwise remains active without exertional chest pain or shortness of breath palpitations.  Gratefully no taste or smell disturbance takes mild antihistamines and nasal steroids for rhinorrhea had a clearing cough and more of a sinus pressure sinus pain in the last 3 to 5 days.  Was negative  Review of Systems   Constitutional:  Negative for fatigue, fever and unexpected weight change.   HENT:  Positive for rhinorrhea, sinus pressure and sinus pain. Negative for sore throat and trouble swallowing.    Eyes:  Negative for visual disturbance.   Respiratory:  Negative for cough, chest tightness and shortness of breath.    Cardiovascular:  Negative for chest pain, palpitations and leg swelling.   Gastrointestinal:  Negative for abdominal pain and blood in stool.   Genitourinary:  Positive for frequency. Negative for dysuria and hematuria.   Musculoskeletal:  Positive for arthralgias. Negative for myalgias.   Skin:  Negative for rash.   Neurological:  Negative for dizziness, weakness, numbness and headaches.   Hematological:  Negative for adenopathy.   Psychiatric/Behavioral:  Negative for  "behavioral problems, sleep disturbance and suicidal ideas.        Objective   /70 (BP Location: Left arm, Patient Position: Sitting, BP Cuff Size: Adult)   Pulse 83   Temp 35.8 °C (96.4 °F) (Temporal)   Resp 16   Ht 1.676 m (5' 6\")   Wt 90.7 kg (200 lb)   SpO2 98%   BMI 32.28 kg/m²   s abnormal data HGB A1C  Order: [id number]   Ref Range & Units 4 mo ago   Hemoglobin A1C 4.3 - 5.6 % 7.4 High    Comment: American Diabetes Association guidelines indicate that patients with HgbA1c in the range 5.7-6.4% are at increased risk for development of diabetes, and intervention by lifestyle modification may be beneficial. HgbA1c greater or equal to 6.5% is considered diagnostic of diabetes.   Estimated Average Glucose mg/dL 166   Comment: eAG: (Estimated average gl     Outside Information      suggestion  Information displayed in this report may not trend or trigger automated decision support.      Contains abnormal data COMP METABOLIC PANEL  Order: [id number]   Ref Range & Units 4 mo ago   Protein, Total 6.3 - 8.0 g/dL 7.3   Albumin 3.9 - 4.9 g/dL 4.8   Calcium, Total 8.5 - 10.2 mg/dL 10.2   Bilirubin, Total 0.2 - 1.3 mg/dL 0.5   Alkaline Phosphatase 34 - 123 U/L 57   AST 13 - 35 U/L 29   ALT 7 - 38 U/L 31   Glucose 74 - 99 mg/dL 148 High    Comment: The American Diabetes Association (ADA) provides guidance for cutoff values for fasting glucose and random glucose. The ADA defines fasting as no caloric intake for at least 8 hours. Fasting plasma glucose results between 100 to 125 mg/dL indicate increased risk for diabetes (prediabetes).  Fasting plasma glucose results greater than or equal to 126 mg/dL meet the criteria for diagnosis of diabetes. In the absence of unequivocal hyperglycemia, results should be confirmed by repeat testing. In a patient with classic symptoms of hyperglycemia or hyperglycemic crisis, random plasma glucose results greater than or equal to 200 mg/dL meet the criteria for diagnosis of " diabetes.  Reference: Standards of Medical Care in Diabetes 2016, American Diabetes Association. Diabetes Care. 2016.39(Suppl 1).   BUN 7 - 21 mg/dL 23 High    Creatinine 0.58 - 0.96 mg/dL 0.91   Sodium 136 - 144 mmol/L 138   Potassium 3.7 - 5.1 mmol/L 5.0   Chloride 97 - 105 mmol/L 102   CO2 22 - 30 mmol/L 23   Anion Gap 9 - 18 mmol/L 13   Estimated Glomerular Filtration Rate >=60 mL/min/1.73m² 73   Comment: Estimated Glomerular Filtration Rate (eGFR) is calculated using the 2021 CKD-EPI creatinine equation. This equation utilizes serum creatinine, sex, and age as parameters. The creatinine assay has traceable calibration to isotope di           Contains abnormal data LIPID PANEL BASIC  Order: [id number]     Ref Range & Units 4 mo ago   Cholesterol, Total <200 mg/dL 124   Comment: <200 mg/dL, Desirable   200-239 mg/dL, Borderline high  >239 mg/dL, High   Triglyceride <150 mg/dL 194 High    Comment: <150 mg/dL, Normal   150-199 mg/dL, Borderline high   200-499 mg/dL, High  >499 mg/dL, Very high   HDL Cholesterol >39 mg/dL 34 Low    Comment:  40-59 mg/dL, Acceptable  >59 mg/dL, High: Negative risk factor for coronary heart disease  <40 mg/dL, Low: Positive risk factor for coronary heart disease   Non HDL Cholesterol <130 mg/dL 90   Comment: <130 mg/dL, Optimal   130-159 mg/dL, Near optimal/above optimal   160-189 mg/dL, Borderline high   190-219 mg/dL, High  >219 mg/dL, Very high  Secondary prevention optimal non HDL Cholesterol levels are recommended to be <100 mg/dL   Fasting Time hrs 12   VLDL Cholesterol <30 mg/dL 39 High    TC:HDL Ratio <5.10 3.65   LDL Cholesterol <100 mg/dL 51   Comment: <100 mg/dL, Optimal   100-129 mg/dL, Near optimal/above optimal   130-159 mg/dL, Borderline high   160-189 mg/dL, High  >189 mg/dL, Very high  Secondary prevention optimal LDL Cholesterol levels are recommended to be < 70 mg/dL   LDL:HDL Ratio <2.54 1.50   Comment: Reference:  1. National Cholesterol Education Program ATP III  Guideline At-A-Glance Quick Desk Reference: National Heart, Lung, and Blood [place]. National Institutes of Health. 2001: NIH Publication No. .  2. An International Atherosclerosis Society position paper: global recommendations for the management of dyslipidemia: executive summary, Atherosclerosis. 2014: 232(2):410-413.   Resulting Agency          Ref Range & Units 4 mo ago   TSH 0.270 - 4.200 mIU/L 1.560   Resulting Agency  [facility]   Specimen Collected: 12/27/22 08:05 Last Resulted: 12/27/22 17:10   Received From: [facility]       Physical Exam  Signs reviewed and normal normal pulse ox  General-inspection reveals a well-developed well-nourished individual in no acute distress  EENT eyes are clear no discharge nose shows moderate turbinate swelling some deviation to the right with yellow rhinorrhea.  Minimal tenderness right maxillary sinus blood TMs scarred retracted without hyperemia.  Oral exam shows posterior injection without ulcerations or exudate  Neck neck is supple out masses adenopathy bruits or rigidity no JVD thyroid is normal.  Chest chest is clear with very faint left upper lobe expiratory rhonchi clear with coughing otherwise no bibasilar rales or rubs  Cardiovascular grade 1/2 over 6 systolic extra murmur but only 1 ectopy per minute no S3 gallop no diastolic murmurs  Abdominal-no organomegaly mass rebound of the mid and upper abdomen no CVA tenderness  Peripheral vascular no symmetric or asymmetric edema no significant sensorimotor vascular deficits  Skin I hyperal lichenification of the palmar aspect of the hands.  Otherwise no petechiae urticaria  Mood mood is stable without anxiety depressive or cognitive issues  Musculoskeletal patient moves upper and lower extremities well no acute synovitis noted.  Earlier labs reviewed by subspecialist      Assessment/Plan   Problem List Items Addressed This Visit          Musculoskeletal    Eczema of hand   Z-Filiberto for recent  sinusitis  Continue nasal steroids and nasal saline  Low salt low carbohydrate low-fat diet  Continue follow-up with cardiology and endocrinology at their discretion  Continue the above diabetic medicines cardiac medicines and Synthroid.  Recheck in 10 to 14 days of persistence or clinical worsening otherwise follow-up in 4 months take her antihistamine at nighttime for allergic rhinitis  Carl stable  @discharge  The above diagnosis and treatment plan was discussed with the patient patient will continue appropriate diet and exercise as reviewed  Patient will recheck earlier if any interval problems of significance or clinical worsening of the above problems.  Agrees above surveillance.  All question were addressed regarding above meds

## 2023-05-07 PROBLEM — K11.20 PAROTIDITIS: Status: RESOLVED | Noted: 2018-01-10 | Resolved: 2023-05-07

## 2023-05-07 ASSESSMENT — ENCOUNTER SYMPTOMS
ABDOMINAL PAIN: 0
WEAKNESS: 0
SLEEP DISTURBANCE: 0
ARTHRALGIAS: 1
COUGH: 0
RHINORRHEA: 1
FREQUENCY: 1
ADENOPATHY: 0
SINUS PRESSURE: 1
NUMBNESS: 0
CHEST TIGHTNESS: 0
MYALGIAS: 0
UNEXPECTED WEIGHT CHANGE: 0
FEVER: 0
TROUBLE SWALLOWING: 0
PALPITATIONS: 0
HEADACHES: 0
SINUS PAIN: 1
DIZZINESS: 0
DYSURIA: 0
HEMATURIA: 0
FATIGUE: 0
SORE THROAT: 0
SHORTNESS OF BREATH: 0
BLOOD IN STOOL: 0

## 2023-07-15 ENCOUNTER — PATIENT MESSAGE (OUTPATIENT)
Dept: PRIMARY CARE | Facility: CLINIC | Age: 58
End: 2023-07-15
Payer: COMMERCIAL

## 2023-07-15 DIAGNOSIS — M62.838 MUSCLE SPASM: ICD-10-CM

## 2023-07-17 RX ORDER — CYCLOBENZAPRINE HCL 10 MG
10 TABLET ORAL 3 TIMES DAILY PRN
Qty: 90 TABLET | Refills: 0 | Status: SHIPPED | OUTPATIENT
Start: 2023-07-17 | End: 2023-10-04 | Stop reason: SDUPTHER

## 2023-07-17 NOTE — TELEPHONE ENCOUNTER
From: Israel Swartz  To: Thom Aguilar DO  Sent: 7/15/2023 2:14 PM EDT  Subject: cyclobenzaprine 10mg    Greetings!  May I please have a refill of my cyclobenzaprine 10mg?  [facility] is my pharmacy.  Hope this message finds you well!    Israel Swartz

## 2023-08-31 PROBLEM — E66.9 OBESITY, CLASS I, BMI 30-34.9: Status: ACTIVE | Noted: 2023-08-31

## 2023-08-31 PROBLEM — E66.811 OBESITY, CLASS I, BMI 30-34.9: Status: ACTIVE | Noted: 2023-08-31

## 2023-09-05 ENCOUNTER — OFFICE VISIT (OUTPATIENT)
Dept: PRIMARY CARE | Facility: CLINIC | Age: 58
End: 2023-09-05
Payer: COMMERCIAL

## 2023-09-05 VITALS
RESPIRATION RATE: 16 BRPM | HEART RATE: 83 BPM | TEMPERATURE: 96 F | SYSTOLIC BLOOD PRESSURE: 116 MMHG | WEIGHT: 202 LBS | HEIGHT: 66 IN | DIASTOLIC BLOOD PRESSURE: 76 MMHG | BODY MASS INDEX: 32.47 KG/M2

## 2023-09-05 DIAGNOSIS — J01.01 ACUTE RECURRENT MAXILLARY SINUSITIS: Primary | ICD-10-CM

## 2023-09-05 DIAGNOSIS — E11.9 TYPE 2 DIABETES MELLITUS WITHOUT COMPLICATION, WITHOUT LONG-TERM CURRENT USE OF INSULIN (MULTI): ICD-10-CM

## 2023-09-05 DIAGNOSIS — J30.1 SEASONAL ALLERGIC RHINITIS DUE TO POLLEN: ICD-10-CM

## 2023-09-05 PROCEDURE — 3078F DIAST BP <80 MM HG: CPT | Performed by: FAMILY MEDICINE

## 2023-09-05 PROCEDURE — 1036F TOBACCO NON-USER: CPT | Performed by: FAMILY MEDICINE

## 2023-09-05 PROCEDURE — 3074F SYST BP LT 130 MM HG: CPT | Performed by: FAMILY MEDICINE

## 2023-09-05 PROCEDURE — 4010F ACE/ARB THERAPY RXD/TAKEN: CPT | Performed by: FAMILY MEDICINE

## 2023-09-05 PROCEDURE — 99213 OFFICE O/P EST LOW 20 MIN: CPT | Performed by: FAMILY MEDICINE

## 2023-09-05 RX ORDER — AZITHROMYCIN 250 MG/1
TABLET, FILM COATED ORAL
Qty: 6 TABLET | Refills: 0 | Status: SHIPPED | OUTPATIENT
Start: 2023-09-05 | End: 2023-12-06 | Stop reason: ALTCHOICE

## 2023-09-05 NOTE — PROGRESS NOTES
"Subjective   Patient ID: Israel Swartz is a 58 y.o. female who presents for Hypertension (4 month follow up ) and Sinusitis.  HPI  Pleasant 58-year-old with history of non-insulin-dependent diabetes hypothyroidism PVCs coronary disease here because of increasing allergy symptoms despite her nasal steroids and Zyrtec.  Does have a fair amount of deep right ear pressure as well as right sinus pressure.  Otherwise no increased cough chest pain shortness breath palpitations  Chronic diabetic medicines and is followed by her endocrinologist also chronic heart medicines followed by her cardiologist gratefully no palpitations chest pain or dyspnea PND or peripheral edema.  Review of Systems   Constitutional:  Negative for fatigue, fever and unexpected weight change.   HENT:  Positive for ear pain, rhinorrhea and sinus pressure. Negative for sore throat and voice change.    Respiratory:  Negative for cough and shortness of breath.    Cardiovascular:  Negative for chest pain, palpitations and leg swelling.   Gastrointestinal:  Positive for abdominal pain. Negative for blood in stool.   Genitourinary:  Positive for frequency. Negative for hematuria and urgency.   Musculoskeletal:  Positive for myalgias.   Neurological:  Positive for headaches. Negative for weakness and light-headedness.   Hematological:  Negative for adenopathy.       Objective   /76 (BP Location: Right arm, Patient Position: Sitting, BP Cuff Size: Large adult)   Pulse 83   Temp 35.6 °C (96 °F) (Temporal)   Resp 16   Ht 1.676 m (5' 6\")   Wt 91.6 kg (202 lb)   BMI 32.60 kg/m²           Physical Exam    Reviewed and normal  General-pleasant obese individual in no distress  ENT PERRL bilaterally sclera is Clear no parable edema tenderness the right maxillary sinus reproduce her pain nose with some deviation with engorged turbinates right greater than left TM scarred retracted with superior hyperemia of the right TM.  Left TM is retracted only oral " exam shows posterior injection only no ulcerations or exudate  Neck supple without worrisome masses adenopathy bruits or rigidity  Pulmonary chest is clear without wheezing rales or rhonchi  Cardiovascular RSR without significant murmurs and only 1 ectopy per minute  Peripheral vascular no symmetric or asymmetric edema no sensorimotor vascular deficits  Skin no rash petechiae or jaundice      Assessment/Plan   Problem List Items Addressed This Visit    None  We will continue very low-dose Sudafed at 30 mg 3 times a day in view of her history of tachycardia cardiac wise is stable we will continue her above cardiac medicines and diabetic medicines  We will add azithromycin in view of her multiple drug allergies for her sinusitis and early right otitis media continue salt water gargle nasal steroids in addition to her antihistamine.  Follow-up in 3 months to check how she is doing make sure she gets her influenza vaccine.  Otherwise call and recheck in 2 weeks if interval worsening  Continue good low-salt low carbohydrate and low-fat diet and follow-up with her endocrinologist and cardiologist at their direction.  @discharge  The above diagnosis and treatment plan was discussed with the patient patient will continue appropriate diet and exercise as reviewed  Patient will recheck earlier if any interval problems of significance or clinical worsening of the above problems.  Agrees above surveillance.  All question were addressed regarding above meds

## 2023-09-07 ASSESSMENT — ENCOUNTER SYMPTOMS
SORE THROAT: 0
HEMATURIA: 0
UNEXPECTED WEIGHT CHANGE: 0
COUGH: 0
FEVER: 0
SINUS PRESSURE: 1
MYALGIAS: 1
ADENOPATHY: 0
BLOOD IN STOOL: 0
VOICE CHANGE: 0
FATIGUE: 0
ABDOMINAL PAIN: 1
WEAKNESS: 0
HEADACHES: 1
RHINORRHEA: 1
FREQUENCY: 1
SHORTNESS OF BREATH: 0
PALPITATIONS: 0
LIGHT-HEADEDNESS: 0

## 2023-10-04 DIAGNOSIS — M62.838 MUSCLE SPASM: ICD-10-CM

## 2023-10-04 RX ORDER — CYCLOBENZAPRINE HCL 10 MG
10 TABLET ORAL 3 TIMES DAILY PRN
Qty: 90 TABLET | Refills: 0 | Status: SHIPPED | OUTPATIENT
Start: 2023-10-04 | End: 2023-12-19

## 2023-10-04 NOTE — TELEPHONE ENCOUNTER
Rx Refill Request Telephone Encounter    Name:  Israel Swartz  :  [id number]  Medication Name:  CYCLOBENZAPRINE   Specific Pharmacy location:  [facility]   Date of last appointment:  23  Date of next appointment:  23  Best number to reach patient:  [phone]

## 2023-10-23 ENCOUNTER — APPOINTMENT (OUTPATIENT)
Dept: RADIOLOGY | Facility: HOSPITAL | Age: 58
End: 2023-10-23
Payer: COMMERCIAL

## 2023-10-23 ENCOUNTER — HOSPITAL ENCOUNTER (OUTPATIENT)
Dept: RADIOLOGY | Facility: HOSPITAL | Age: 58
Discharge: HOME | End: 2023-10-23
Payer: COMMERCIAL

## 2023-10-23 DIAGNOSIS — Z12.31 ENCOUNTER FOR SCREENING MAMMOGRAM FOR MALIGNANT NEOPLASM OF BREAST: ICD-10-CM

## 2023-10-23 DIAGNOSIS — Z13.820 ENCOUNTER FOR SCREENING FOR OSTEOPOROSIS: ICD-10-CM

## 2023-10-23 DIAGNOSIS — Z78.0 ASYMPTOMATIC MENOPAUSAL STATE: ICD-10-CM

## 2023-10-23 PROCEDURE — 77080 DXA BONE DENSITY AXIAL: CPT | Mod: LIO | Performed by: RADIOLOGY

## 2023-10-23 PROCEDURE — 77080 DXA BONE DENSITY AXIAL: CPT | Mod: LIO

## 2023-10-25 DIAGNOSIS — I25.10 ATHEROSCLEROSIS OF CORONARY ARTERY, UNSPECIFIED VESSEL OR LESION TYPE, UNSPECIFIED WHETHER ANGINA PRESENT, UNSPECIFIED WHETHER NATIVE OR TRANSPLANTED HEART: ICD-10-CM

## 2023-10-25 DIAGNOSIS — I20.9 ANGINA, CLASS III (CMS-HCC): ICD-10-CM

## 2023-10-25 RX ORDER — ISOSORBIDE MONONITRATE 60 MG/1
60 TABLET, EXTENDED RELEASE ORAL DAILY
Qty: 90 TABLET | Refills: 3 | Status: SHIPPED | OUTPATIENT
Start: 2023-10-25 | End: 2024-10-24

## 2023-11-06 ENCOUNTER — HOSPITAL ENCOUNTER (OUTPATIENT)
Dept: RADIOLOGY | Facility: HOSPITAL | Age: 58
Discharge: HOME | End: 2023-11-06
Payer: COMMERCIAL

## 2023-11-06 DIAGNOSIS — Z12.31 ENCOUNTER FOR SCREENING MAMMOGRAM FOR MALIGNANT NEOPLASM OF BREAST: ICD-10-CM

## 2023-11-06 DIAGNOSIS — Z78.0 ASYMPTOMATIC MENOPAUSAL STATE: ICD-10-CM

## 2023-11-06 DIAGNOSIS — Z13.820 ENCOUNTER FOR SCREENING FOR OSTEOPOROSIS: ICD-10-CM

## 2023-11-06 PROCEDURE — 77063 BREAST TOMOSYNTHESIS BI: CPT | Mod: LIO | Performed by: RADIOLOGY

## 2023-11-06 PROCEDURE — 77067 SCR MAMMO BI INCL CAD: CPT | Mod: LIO

## 2023-11-06 PROCEDURE — 77067 SCR MAMMO BI INCL CAD: CPT | Mod: LIO | Performed by: RADIOLOGY

## 2023-11-16 ENCOUNTER — TELEPHONE (OUTPATIENT)
Dept: CARDIOLOGY | Facility: CLINIC | Age: 58
End: 2023-11-16
Payer: COMMERCIAL

## 2023-11-16 DIAGNOSIS — R00.0 TACHYCARDIA: ICD-10-CM

## 2023-11-16 DIAGNOSIS — R00.2 PALPITATIONS: Primary | ICD-10-CM

## 2023-11-16 NOTE — TELEPHONE ENCOUNTER
Patient last seen 8/17/23 with Dr. Edgardo Martin MD [facility] :  History of Present Illness  Patient is here for follow-up. She does continue to have a stable angina but nothing worse than before. History of diabetes and thyroid problems. She has been doing well no complaint no symptoms of syncope or presyncope. I discussed with the patient at length that we will continue medication she will let me know if there is any problem with angina. She does have nitro at home. She will see me back as scheduled   ----------------------------------------    Patient is already taking Metoprolol 100mg BID and is on Magnesium BID.     Do you want to see her in office again since there is an increase in symptoms?

## 2023-11-16 NOTE — TELEPHONE ENCOUNTER
Pt called office back.  Pt states that today her heart rate is 100 bpm.  Per pt over the weekend and Monday she had an episode that only lasted a couple of second but she had PVC's, tingling in the left are that went down to her fingers and was dizzy.  Per pt her heart rate felt like it was running really high but does not know what the actual rate was.  Pt states that she was started on Mounjaro last week Wednesday.  Pt reached out to her Endocrinologist and was told that the symptoms are not from the Mounjaro.  Pt would like return call with recommendations.

## 2023-11-16 NOTE — TELEPHONE ENCOUNTER
Pt left message wanting to know if she can increase her metoprolol by 50 mg.  Pt states that she has had increase in pvc's, palps, and racing heart beats.  Pt is asking for return call with recommendations.    Call placed to pt, left message asking pt to call office back with heart rates.  Routed to Min Gr RN

## 2023-11-16 NOTE — TELEPHONE ENCOUNTER
Call placed to patient, message given with verbal understanding. Denies questions.     Monitor ordered and sent to provider for signing.

## 2023-11-17 ENCOUNTER — APPOINTMENT (OUTPATIENT)
Dept: CARDIOLOGY | Facility: CLINIC | Age: 58
End: 2023-11-17
Payer: COMMERCIAL

## 2023-11-30 DIAGNOSIS — E78.5 DYSLIPIDEMIA: ICD-10-CM

## 2023-11-30 RX ORDER — FENOFIBRATE 145 MG/1
145 TABLET, FILM COATED ORAL DAILY
Qty: 90 TABLET | Refills: 3 | Status: SHIPPED | OUTPATIENT
Start: 2023-11-30 | End: 2024-11-29

## 2023-12-06 ENCOUNTER — OFFICE VISIT (OUTPATIENT)
Dept: PRIMARY CARE | Facility: CLINIC | Age: 58
End: 2023-12-06
Payer: COMMERCIAL

## 2023-12-06 VITALS
SYSTOLIC BLOOD PRESSURE: 112 MMHG | HEART RATE: 78 BPM | OXYGEN SATURATION: 97 % | BODY MASS INDEX: 31.82 KG/M2 | WEIGHT: 198 LBS | DIASTOLIC BLOOD PRESSURE: 76 MMHG | RESPIRATION RATE: 16 BRPM | HEIGHT: 66 IN | TEMPERATURE: 96 F

## 2023-12-06 DIAGNOSIS — J32.0 CHRONIC MAXILLARY SINUSITIS: Primary | ICD-10-CM

## 2023-12-06 DIAGNOSIS — I20.9 ANGINA PECTORIS (CMS-HCC): ICD-10-CM

## 2023-12-06 DIAGNOSIS — I10 PRIMARY HYPERTENSION: ICD-10-CM

## 2023-12-06 PROCEDURE — 3074F SYST BP LT 130 MM HG: CPT | Performed by: FAMILY MEDICINE

## 2023-12-06 PROCEDURE — 4010F ACE/ARB THERAPY RXD/TAKEN: CPT | Performed by: FAMILY MEDICINE

## 2023-12-06 PROCEDURE — 99213 OFFICE O/P EST LOW 20 MIN: CPT | Performed by: FAMILY MEDICINE

## 2023-12-06 PROCEDURE — 3078F DIAST BP <80 MM HG: CPT | Performed by: FAMILY MEDICINE

## 2023-12-06 PROCEDURE — 1036F TOBACCO NON-USER: CPT | Performed by: FAMILY MEDICINE

## 2023-12-06 RX ORDER — GLIMEPIRIDE 2 MG/1
1 TABLET ORAL
COMMUNITY
Start: 2023-11-21

## 2023-12-06 RX ORDER — TIRZEPATIDE 5 MG/.5ML
5 INJECTION, SOLUTION SUBCUTANEOUS
COMMUNITY
End: 2024-03-06 | Stop reason: ENTERED-IN-ERROR

## 2023-12-06 NOTE — PROGRESS NOTES
"Subjective   Patient ID: Israel Swartz is a 58 y.o. female who presents for Sinusitis (2 month follow up ).  HPI  58-year-old female is here to recheck her eustachian dysfunction and chronic sinusitis does take her nasal saline and also her antihistamines otherwise has some bilateral ear pressure recently said more chest pain has a future appointment soon with Dr. Martin to review her ongoing angina.  She has taken her chronic cardiac medicines.  Diabetes is followed by diabetic Dr. Needs a refill of her Mounjaro.  She also takes Amaryl Actos Mounjaro [place].  At this time has no striking neck neck swelling pain, cough, fever chills or significant ear pain.  No taste or smell disturbance  Chronic meds reviewed no reported side effects.  Review of Systems   Constitutional:  Negative for fatigue, fever and unexpected weight change.   HENT:  Positive for rhinorrhea. Negative for ear pain, sinus pressure, sinus pain and sore throat.    Eyes:  Negative for visual disturbance.   Respiratory:  Negative for cough, chest tightness, shortness of breath and wheezing.    Cardiovascular:  Positive for chest pain. Negative for palpitations and leg swelling.   Gastrointestinal:  Negative for abdominal pain and blood in stool.   Genitourinary:  Negative for dysuria, frequency and hematuria.   Musculoskeletal:  Negative for arthralgias and myalgias.   Skin:  Negative for rash.   Neurological:  Negative for weakness and headaches.   Psychiatric/Behavioral:  Negative for behavioral problems and sleep disturbance.        Objective   /76 (BP Location: Right arm, Patient Position: Sitting, BP Cuff Size: Adult)   Pulse 78   Temp 35.6 °C (96 °F) (Temporal)   Resp 16   Ht 1.676 m (5' 6\")   Wt 89.8 kg (198 lb)   SpO2 97%   BMI 31.96 kg/m²           Physical Exam  Vital signs reviewed and normal  ENT eyes show PERRLA bilaterally sclera is clear no parable edema nose striking 10 sinus tenderness today.  Both TMs scarred " retracted with minimal air-fluid level oral exam is benign  Neck neck is supple without masses adenopathy bruits or rigidity.  Cardiovascular RSR without ectopy except 1/min no ask 3 gallop like murmurs no systolic murmurs today  Pulmonary-chest clear anteriorly and posteriorly  Peripheral vascular notes symmetric or asymmetric edema no vascular deficits noted.  Skin no rash petechiae or jaundice      Assessment/Plan   Problem List Items Addressed This Visit    None  Will continue using her normal saline and occasional early morning nasal steroids will avoid decongestants with recent anginal pain  She will continue her nitroglycerin if needed and continue above cardiac medicines outlined by her cardiologist  Endorses her appointment this upcoming week with her cardiologist regarding her intermittent chest pain.  Follow the above diabetic diet low-salt low-fat diet and above diabetic medicines 2 of which were renewed today.  Follow-up in 6 weeks to review the buffed cardiology evaluation and recommendations all questions were addressed  If worsening chest pain or shortness of breath despite treatment with weakness, proceed to ER

## 2023-12-07 ENCOUNTER — LAB (OUTPATIENT)
Dept: LAB | Facility: LAB | Age: 58
End: 2023-12-07
Payer: COMMERCIAL

## 2023-12-07 ENCOUNTER — OFFICE VISIT (OUTPATIENT)
Dept: CARDIOLOGY | Facility: CLINIC | Age: 58
End: 2023-12-07
Payer: COMMERCIAL

## 2023-12-07 VITALS
SYSTOLIC BLOOD PRESSURE: 120 MMHG | HEIGHT: 66 IN | WEIGHT: 199.7 LBS | HEART RATE: 88 BPM | BODY MASS INDEX: 32.09 KG/M2 | DIASTOLIC BLOOD PRESSURE: 80 MMHG

## 2023-12-07 DIAGNOSIS — I25.10 ATHEROSCLEROSIS OF NATIVE CORONARY ARTERY OF NATIVE HEART, UNSPECIFIED WHETHER ANGINA PRESENT: ICD-10-CM

## 2023-12-07 DIAGNOSIS — Z01.818 PRE-OP TESTING: ICD-10-CM

## 2023-12-07 DIAGNOSIS — E66.9 CLASS 1 OBESITY WITH BODY MASS INDEX (BMI) OF 32.0 TO 32.9 IN ADULT, UNSPECIFIED OBESITY TYPE, UNSPECIFIED WHETHER SERIOUS COMORBIDITY PRESENT: ICD-10-CM

## 2023-12-07 DIAGNOSIS — E11.9 TYPE 2 DIABETES MELLITUS WITHOUT COMPLICATION, UNSPECIFIED WHETHER LONG TERM INSULIN USE (MULTI): ICD-10-CM

## 2023-12-07 DIAGNOSIS — I20.9 ANGINA PECTORIS (CMS-HCC): ICD-10-CM

## 2023-12-07 DIAGNOSIS — E78.2 MIXED HYPERLIPIDEMIA: ICD-10-CM

## 2023-12-07 DIAGNOSIS — I10 BENIGN ESSENTIAL HYPERTENSION: ICD-10-CM

## 2023-12-07 PROBLEM — E78.5 DYSLIPIDEMIA: Status: RESOLVED | Noted: 2023-05-02 | Resolved: 2023-12-07

## 2023-12-07 PROBLEM — Z87.891 FORMER SMOKER: Status: ACTIVE | Noted: 2023-12-07

## 2023-12-07 LAB
ANION GAP SERPL CALC-SCNC: 16 MMOL/L (ref 10–20)
BUN SERPL-MCNC: 25 MG/DL (ref 6–23)
CALCIUM SERPL-MCNC: 10.4 MG/DL (ref 8.6–10.3)
CHLORIDE SERPL-SCNC: 102 MMOL/L (ref 98–107)
CO2 SERPL-SCNC: 23 MMOL/L (ref 21–32)
CREAT SERPL-MCNC: 0.93 MG/DL (ref 0.5–1.05)
ERYTHROCYTE [DISTWIDTH] IN BLOOD BY AUTOMATED COUNT: 12.7 % (ref 11.5–14.5)
GFR SERPL CREATININE-BSD FRML MDRD: 71 ML/MIN/1.73M*2
GLUCOSE SERPL-MCNC: 176 MG/DL (ref 74–99)
HCT VFR BLD AUTO: 46.5 % (ref 36–46)
HGB BLD-MCNC: 15.4 G/DL (ref 12–16)
MCH RBC QN AUTO: 30.1 PG (ref 26–34)
MCHC RBC AUTO-ENTMCNC: 33.1 G/DL (ref 32–36)
MCV RBC AUTO: 91 FL (ref 80–100)
NRBC BLD-RTO: 0 /100 WBCS (ref 0–0)
PLATELET # BLD AUTO: 172 X10*3/UL (ref 150–450)
POTASSIUM SERPL-SCNC: 4.2 MMOL/L (ref 3.5–5.3)
RBC # BLD AUTO: 5.11 X10*6/UL (ref 4–5.2)
SODIUM SERPL-SCNC: 137 MMOL/L (ref 136–145)
WBC # BLD AUTO: 8.2 X10*3/UL (ref 4.4–11.3)

## 2023-12-07 PROCEDURE — 3074F SYST BP LT 130 MM HG: CPT | Performed by: INTERNAL MEDICINE

## 2023-12-07 PROCEDURE — 3008F BODY MASS INDEX DOCD: CPT | Performed by: INTERNAL MEDICINE

## 2023-12-07 PROCEDURE — 4010F ACE/ARB THERAPY RXD/TAKEN: CPT | Performed by: INTERNAL MEDICINE

## 2023-12-07 PROCEDURE — 36415 COLL VENOUS BLD VENIPUNCTURE: CPT

## 2023-12-07 PROCEDURE — 3079F DIAST BP 80-89 MM HG: CPT | Performed by: INTERNAL MEDICINE

## 2023-12-07 PROCEDURE — 99214 OFFICE O/P EST MOD 30 MIN: CPT | Performed by: INTERNAL MEDICINE

## 2023-12-07 PROCEDURE — 80048 BASIC METABOLIC PNL TOTAL CA: CPT

## 2023-12-07 PROCEDURE — 85027 COMPLETE CBC AUTOMATED: CPT

## 2023-12-07 PROCEDURE — 1036F TOBACCO NON-USER: CPT | Performed by: INTERNAL MEDICINE

## 2023-12-07 ASSESSMENT — ENCOUNTER SYMPTOMS
PALPITATIONS: 1
SHORTNESS OF BREATH: 1

## 2023-12-07 NOTE — PATIENT INSTRUCTIONS
You will be scheduled for a cath possible intervention  Please have your labwork done within 1 week of procedure.    Continue same medications/treatment.  Patient educated on proper medication use.  Please bring all medicines, vitamins and herbal supplements with you when you come to the office.    I, Aniya Metcalf LPN, am scribing for and in the presence of  Dr. Edgardo Martin MD, FACC

## 2023-12-07 NOTE — PROGRESS NOTES
Referred by Dr. Lopez ref. provider found provider found for   Chief Complaint   Patient presents with    Chest Pain     Patient being seen for complaints of frequent PVCs, chest pain and palpitations. We ordered Holter monitor to be done, currently scheduled for 12/12/23. Patient wanted to be seen still in advance of Holter anyways.   Last OV Aug 2023        History of Present Illness  Israel Swartz is a 58 y.o. year old female patient with extensive history of coronary artery disease status post remote coronary stenting.  Patient with history of diabetes history of hyperlipidemia and strong family history premature coronaries.  She stated that recently she been having transient episode of palpitation and PVCs.  This was associated with chest pain patient stated everything she wants to get chest pain chest to stop.  She had taken nitroglycerin with relief.  The pain is similar to what she has had prior to her angioplasty and stenting.  Been several years now since she had the angina recently.  I discussed with the patient at length that symptoms are not suggestive question the patient.  With EKG that shows sinus rhythm with nonspecific ST-T wave changes.  Because of her multiple risk factor for coronary disease and recurrent angina frequent PVCs we will go ahead with coronary angiography.  Risk and benefit explained to patient.  Patient Dr. Laura Drake intentionally    Past Medical History  Past Medical History:   Diagnosis Date    Dizziness and giddiness 11/10/2021    Lightheadedness    Encounter for preprocedural cardiovascular examination 11/16/2021    Pre-operative cardiovascular examination    Non-Hodgkin lymphoma, unspecified, lymph nodes of head, face, and neck (CMS/HCC) 11/10/2021    Malignant lymphomas of lymph nodes of head, face, and neck    Otalgia, left ear 06/02/2020    Otalgia of left ear    Other acute postprocedural pain 02/18/2022    Acute postoperative pain    Personal history of other diseases of  the musculoskeletal system and connective tissue 07/16/2019    History of muscle spasm    Personal history of other diseases of the nervous system and sense organs 01/01/2022    History of sciatica    Personal history of other endocrine, nutritional and metabolic disease 05/17/2022    History of hypercalcemia    Personal history of other specified conditions     History of polyuria    Personal history of other specified conditions 11/10/2021    History of shortness of breath    Personal history of other specified conditions 11/16/2021    History of chest pain    Personal history of other specified conditions 11/10/2021    History of palpitations    Personal history of urinary (tract) infections 11/24/2020    History of acute cystitis    Sialoadenitis, unspecified 05/05/2020    Sialadenitis    Unspecified asthma, uncomplicated 08/16/2022    Asthma, acute       Social History  Social History     Tobacco Use    Smoking status: Never    Smokeless tobacco: Never   Substance Use Topics    Alcohol use: Not Currently    Drug use: Never       Family History     Family History   Problem Relation Name Age of Onset    Liver cancer Mother      Atrial fibrillation Father      Hypertension Father      Hyperlipidemia Father      Fainting Father           Review of Systems   Cardiovascular:  Positive for chest pain and palpitations.   Respiratory:  Positive for shortness of breath.        As per HPI, all other systems reviewed and negative.    Allergies:  Allergies   Allergen Reactions    Cefdinir Hives and Itching    Codeine Nausea Only     pt states she can take synthetic codeine like in Vicodin    Propoxyphene N-Acetaminophen Nausea Only    Sulfa (Sulfonamide Antibiotics) Nausea Only    Tetracyclines Hives and Itching        Outpatient Medications:  Current Outpatient Medications   Medication Instructions    albuterol 90 mcg/actuation inhaler 2 puffs, inhalation, Every 4 hours PRN    aspirin 81 mg EC tablet 1 tablet, oral, Daily     Cartia  mg, oral, Daily    cetirizine (ZYRTEC) 10 mg, oral, Daily    cholecalciferol (VITAMIN D-3) 2,000 Units, oral, Daily    cyanocobalamin, vitamin B-12, (Vitamin B-12) 1,000 mcg tablet extended release 1 tablet, oral, Daily    cyclobenzaprine (FLEXERIL) 10 mg, oral, 3 times daily PRN    fenofibrate (TRICOR) 145 mg, oral, Daily    fluoride, sodium, 1.1 % gel dental    fluticasone (Flonase) 50 mcg/actuation nasal spray 1 spray, Each Nostril, Daily    glimepiride (Amaryl) 2 mg tablet 1 tablet, oral, Daily with breakfast    isosorbide mononitrate ER (IMDUR) 60 mg, oral, Daily    levothyroxine (SYNTHROID, LEVOXYL) 88 mcg, oral, Daily    lisinopril 2.5 mg, oral, Daily    lovastatin (MEVACOR) 20 mg, oral, Nightly    magnesium oxide (Mag-Ox) 400 mg (241.3 mg magnesium) tablet 1 tablet, oral, 2 times daily    metFORMIN XR (Glucophage-XR) 500 mg 24 hr tablet 2 tablets, oral, 2 times daily    metoprolol tartrate (LOPRESSOR) 100 mg, oral, 2 times daily    Mounjaro 5 mg, subcutaneous, Weekly    nitroglycerin (NITROSTAT) 0.4 mg, sublingual, Every 5 min PRN    pioglitazone (ACTOS) 30 mg, oral, Daily    spironolactone (ALDACTONE) 25 mg, oral, Daily    triamcinolone (Kenalog) 0.1 % ointment 1 Application, Topical, 2 times daily         Vitals:  Vitals:    12/07/23 1022   BP: 120/80   Pulse: 88       Physical Exam:  Physical Exam  Constitutional:       Appearance: Normal appearance.   HENT:      Head: Normocephalic and atraumatic.   Eyes:      Extraocular Movements: Extraocular movements intact.      Pupils: Pupils are equal, round, and reactive to light.   Cardiovascular:      Rate and Rhythm: Normal rate and regular rhythm.      Pulses: Normal pulses.      Heart sounds: Normal heart sounds.   Pulmonary:      Effort: Pulmonary effort is normal.      Breath sounds: Normal breath sounds.   Musculoskeletal:         General: Normal range of motion.      Cervical back: Normal range of motion and neck supple.   Skin:      General: Skin is warm and dry.   Neurological:      General: No focal deficit present.      Mental Status: She is alert and oriented to person, place, and time.             Assessment/Plan   Diagnoses and all orders for this visit:  Atherosclerosis of native coronary artery of native heart, unspecified whether angina present  Benign essential hypertension  Type 2 diabetes mellitus without complication, unspecified whether long term insulin use (CMS/[facility])  Mixed hyperlipidemia  Class 1 obesity with body mass index (BMI) of 32.0 to 32.9 in adult, unspecified obesity type, unspecified whether serious comorbidity present  Angina pectoris (CMS/[facility])  Pre-op testing          Edgardo Martin MD [facility]  Interventional Cardiology   of [facility]     Thank you for allowing me to participate in the care of this patient. Please do not hesitate to contact me with any further questions or concerns.

## 2023-12-10 ASSESSMENT — ENCOUNTER SYMPTOMS
WHEEZING: 0
CHEST TIGHTNESS: 0
SLEEP DISTURBANCE: 0
ABDOMINAL PAIN: 0
HEADACHES: 0
MYALGIAS: 0
FREQUENCY: 0
SINUS PAIN: 0
COUGH: 0
ARTHRALGIAS: 0
WEAKNESS: 0
BLOOD IN STOOL: 0
FEVER: 0
DYSURIA: 0
RHINORRHEA: 1
SINUS PRESSURE: 0
FATIGUE: 0
UNEXPECTED WEIGHT CHANGE: 0
PALPITATIONS: 0
SHORTNESS OF BREATH: 0
HEMATURIA: 0
SORE THROAT: 0

## 2023-12-12 ENCOUNTER — HOSPITAL ENCOUNTER (OUTPATIENT)
Dept: CARDIOLOGY | Facility: HOSPITAL | Age: 58
Discharge: HOME | End: 2023-12-12
Payer: COMMERCIAL

## 2023-12-12 DIAGNOSIS — R00.2 PALPITATIONS: ICD-10-CM

## 2023-12-12 DIAGNOSIS — R00.0 TACHYCARDIA: ICD-10-CM

## 2023-12-12 PROCEDURE — 93227 XTRNL ECG REC<48 HR R&I: CPT | Performed by: INTERNAL MEDICINE

## 2023-12-12 PROCEDURE — 93225 XTRNL ECG REC<48 HRS REC: CPT

## 2023-12-13 ENCOUNTER — TELEPHONE (OUTPATIENT)
Dept: CARDIOLOGY | Facility: CLINIC | Age: 58
End: 2023-12-13
Payer: COMMERCIAL

## 2023-12-13 NOTE — TELEPHONE ENCOUNTER
Pt left message stating that she is scheduled for Cardiac cath tomorrow 12/14/2023.  Pt states that she woke up with a head cold and now vomiting.  Pt states that she thinks that the cath will need to be rescheduled.    Information given to Valerie Escobar to contact pt to reschedule cath

## 2023-12-14 ENCOUNTER — TELEPHONE (OUTPATIENT)
Dept: PRIMARY CARE | Facility: CLINIC | Age: 58
End: 2023-12-14
Payer: COMMERCIAL

## 2023-12-14 DIAGNOSIS — U07.1 ACUTE BRONCHITIS DUE TO COVID-19 VIRUS: Primary | ICD-10-CM

## 2023-12-14 DIAGNOSIS — J20.8 ACUTE BRONCHITIS DUE TO COVID-19 VIRUS: Primary | ICD-10-CM

## 2023-12-14 RX ORDER — NIRMATRELVIR AND RITONAVIR 300-100 MG
3 KIT ORAL 2 TIMES DAILY
Qty: 30 TABLET | Refills: 0 | Status: SHIPPED | OUTPATIENT
Start: 2023-12-14 | End: 2023-12-19

## 2023-12-14 NOTE — TELEPHONE ENCOUNTER
Patient tested positive for covid today, her symptoms started on Tuesday 12/12. She is requesting paxlovid  Send to [facility] [place]

## 2023-12-18 DIAGNOSIS — M62.838 MUSCLE SPASM: ICD-10-CM

## 2023-12-19 RX ORDER — CYCLOBENZAPRINE HCL 10 MG
10 TABLET ORAL 3 TIMES DAILY PRN
Qty: 90 TABLET | Refills: 0 | Status: SHIPPED | OUTPATIENT
Start: 2023-12-19 | End: 2024-03-06 | Stop reason: SDUPTHER

## 2023-12-19 NOTE — TELEPHONE ENCOUNTER
Recent Visits  Date Type Provider Dept   12/06/23 Office Visit Thom Aguilar, DO [facility]   09/05/23 Office Visit Thom Aguilar, DO [facility]   05/02/23 Office Visit Thom Aguilar, DO [facility]   Showing recent visits within past 540 days and meeting all other requirements  Future Appointments  Date Type Provider Dept   03/06/24 Appointment Thom Aguilar, DO [facility]   Showing future appointments within next 180 days and meeting all other requirements

## 2023-12-20 ENCOUNTER — TELEPHONE (OUTPATIENT)
Dept: PRIMARY CARE | Facility: CLINIC | Age: 58
End: 2023-12-20
Payer: COMMERCIAL

## 2023-12-20 DIAGNOSIS — J32.0 CHRONIC MAXILLARY SINUSITIS: Primary | ICD-10-CM

## 2023-12-20 RX ORDER — AZITHROMYCIN 250 MG/1
TABLET, FILM COATED ORAL
Qty: 6 TABLET | Refills: 0 | Status: SHIPPED | OUTPATIENT
Start: 2023-12-20 | End: 2024-01-11 | Stop reason: ALTCHOICE

## 2023-12-20 NOTE — TELEPHONE ENCOUNTER
Patient called in stating her covid has turned into a sinus infection. She is wondering if an antibiotic can be called in for her  [facility] in [place]

## 2023-12-21 ENCOUNTER — APPOINTMENT (OUTPATIENT)
Dept: CARDIOLOGY | Facility: CLINIC | Age: 58
End: 2023-12-21
Payer: COMMERCIAL

## 2024-01-10 DIAGNOSIS — I10 HYPERTENSION, UNSPECIFIED TYPE: Primary | ICD-10-CM

## 2024-01-10 RX ORDER — LISINOPRIL 2.5 MG/1
2.5 TABLET ORAL DAILY
Qty: 90 TABLET | Refills: 3 | Status: SHIPPED | OUTPATIENT
Start: 2024-01-10 | End: 2025-01-09

## 2024-01-12 ENCOUNTER — APPOINTMENT (OUTPATIENT)
Dept: CARDIOLOGY | Facility: HOSPITAL | Age: 59
End: 2024-01-12
Payer: COMMERCIAL

## 2024-01-12 ENCOUNTER — HOSPITAL ENCOUNTER (OUTPATIENT)
Facility: HOSPITAL | Age: 59
Setting detail: OUTPATIENT SURGERY
Discharge: HOME | End: 2024-01-12
Attending: INTERNAL MEDICINE | Admitting: INTERNAL MEDICINE
Payer: COMMERCIAL

## 2024-01-12 VITALS
BODY MASS INDEX: 31.71 KG/M2 | WEIGHT: 197.31 LBS | OXYGEN SATURATION: 93 % | TEMPERATURE: 97.5 F | HEIGHT: 66 IN | SYSTOLIC BLOOD PRESSURE: 108 MMHG | DIASTOLIC BLOOD PRESSURE: 75 MMHG | HEART RATE: 64 BPM | RESPIRATION RATE: 18 BRPM

## 2024-01-12 DIAGNOSIS — I20.9 ANGINA PECTORIS (CMS-HCC): ICD-10-CM

## 2024-01-12 DIAGNOSIS — I25.10 ATHEROSCLEROSIS OF NATIVE CORONARY ARTERY OF NATIVE HEART, UNSPECIFIED WHETHER ANGINA PRESENT: Primary | ICD-10-CM

## 2024-01-12 LAB
ANION GAP SERPL CALC-SCNC: 14 MMOL/L (ref 10–20)
ATRIAL RATE: 87 BPM
BUN SERPL-MCNC: 23 MG/DL (ref 6–23)
CALCIUM SERPL-MCNC: 9.9 MG/DL (ref 8.6–10.3)
CHLORIDE SERPL-SCNC: 104 MMOL/L (ref 98–107)
CO2 SERPL-SCNC: 23 MMOL/L (ref 21–32)
CREAT SERPL-MCNC: 1.07 MG/DL (ref 0.5–1.05)
EGFRCR SERPLBLD CKD-EPI 2021: 60 ML/MIN/1.73M*2
ERYTHROCYTE [DISTWIDTH] IN BLOOD BY AUTOMATED COUNT: 12.9 % (ref 11.5–14.5)
GLUCOSE SERPL-MCNC: 138 MG/DL (ref 74–99)
HCT VFR BLD AUTO: 44.3 % (ref 36–46)
HGB BLD-MCNC: 15.4 G/DL (ref 12–16)
MCH RBC QN AUTO: 30.9 PG (ref 26–34)
MCHC RBC AUTO-ENTMCNC: 34.8 G/DL (ref 32–36)
MCV RBC AUTO: 89 FL (ref 80–100)
NRBC BLD-RTO: 0 /100 WBCS (ref 0–0)
P AXIS: 32 DEGREES
P OFFSET: 177 MS
P ONSET: 126 MS
PLATELET # BLD AUTO: 164 X10*3/UL (ref 150–450)
POTASSIUM SERPL-SCNC: 4.4 MMOL/L (ref 3.5–5.3)
PR INTERVAL: 180 MS
Q ONSET: 216 MS
QRS COUNT: 15 BEATS
QRS DURATION: 88 MS
QT INTERVAL: 378 MS
QTC CALCULATION(BAZETT): 454 MS
QTC FREDERICIA: 427 MS
R AXIS: -5 DEGREES
RBC # BLD AUTO: 4.99 X10*6/UL (ref 4–5.2)
SODIUM SERPL-SCNC: 137 MMOL/L (ref 136–145)
T AXIS: 33 DEGREES
T OFFSET: 405 MS
VENTRICULAR RATE: 87 BPM
WBC # BLD AUTO: 8.5 X10*3/UL (ref 4.4–11.3)

## 2024-01-12 PROCEDURE — 93010 ELECTROCARDIOGRAM REPORT: CPT | Performed by: INTERNAL MEDICINE

## 2024-01-12 PROCEDURE — 2720000007 HC OR 272 NO HCPCS: Performed by: INTERNAL MEDICINE

## 2024-01-12 PROCEDURE — 2550000001 HC RX 255 CONTRASTS: Performed by: INTERNAL MEDICINE

## 2024-01-12 PROCEDURE — 99152 MOD SED SAME PHYS/QHP 5/>YRS: CPT | Performed by: INTERNAL MEDICINE

## 2024-01-12 PROCEDURE — 80048 BASIC METABOLIC PNL TOTAL CA: CPT | Performed by: NURSE PRACTITIONER

## 2024-01-12 PROCEDURE — 94760 N-INVAS EAR/PLS OXIMETRY 1: CPT

## 2024-01-12 PROCEDURE — 7100000009 HC PHASE TWO TIME - INITIAL BASE CHARGE: Performed by: INTERNAL MEDICINE

## 2024-01-12 PROCEDURE — 2500000004 HC RX 250 GENERAL PHARMACY W/ HCPCS (ALT 636 FOR OP/ED): Performed by: NURSE PRACTITIONER

## 2024-01-12 PROCEDURE — 93005 ELECTROCARDIOGRAM TRACING: CPT | Mod: 59

## 2024-01-12 PROCEDURE — 99153 MOD SED SAME PHYS/QHP EA: CPT | Performed by: INTERNAL MEDICINE

## 2024-01-12 PROCEDURE — 2500000004 HC RX 250 GENERAL PHARMACY W/ HCPCS (ALT 636 FOR OP/ED): Performed by: INTERNAL MEDICINE

## 2024-01-12 PROCEDURE — 2500000005 HC RX 250 GENERAL PHARMACY W/O HCPCS: Performed by: INTERNAL MEDICINE

## 2024-01-12 PROCEDURE — 99222 1ST HOSP IP/OBS MODERATE 55: CPT | Performed by: NURSE PRACTITIONER

## 2024-01-12 PROCEDURE — 93458 L HRT ARTERY/VENTRICLE ANGIO: CPT | Performed by: INTERNAL MEDICINE

## 2024-01-12 PROCEDURE — 7100000010 HC PHASE TWO TIME - EACH INCREMENTAL 1 MINUTE: Performed by: INTERNAL MEDICINE

## 2024-01-12 PROCEDURE — 36415 COLL VENOUS BLD VENIPUNCTURE: CPT | Performed by: NURSE PRACTITIONER

## 2024-01-12 PROCEDURE — 85027 COMPLETE CBC AUTOMATED: CPT | Performed by: NURSE PRACTITIONER

## 2024-01-12 RX ORDER — ASPIRIN 325 MG
325 TABLET ORAL ONCE
Status: DISCONTINUED | OUTPATIENT
Start: 2024-01-12 | End: 2024-01-12

## 2024-01-12 RX ORDER — LIDOCAINE HYDROCHLORIDE 20 MG/ML
INJECTION, SOLUTION INFILTRATION; PERINEURAL AS NEEDED
Status: DISCONTINUED | OUTPATIENT
Start: 2024-01-12 | End: 2024-01-12 | Stop reason: HOSPADM

## 2024-01-12 RX ORDER — MIDAZOLAM HYDROCHLORIDE 1 MG/ML
INJECTION INTRAMUSCULAR; INTRAVENOUS AS NEEDED
Status: DISCONTINUED | OUTPATIENT
Start: 2024-01-12 | End: 2024-01-12 | Stop reason: HOSPADM

## 2024-01-12 RX ORDER — SODIUM CHLORIDE 9 MG/ML
100 INJECTION, SOLUTION INTRAVENOUS CONTINUOUS
Status: ACTIVE | OUTPATIENT
Start: 2024-01-12 | End: 2024-01-12

## 2024-01-12 RX ORDER — FENTANYL CITRATE 50 UG/ML
INJECTION, SOLUTION INTRAMUSCULAR; INTRAVENOUS AS NEEDED
Status: DISCONTINUED | OUTPATIENT
Start: 2024-01-12 | End: 2024-01-12 | Stop reason: HOSPADM

## 2024-01-12 RX ORDER — SODIUM CHLORIDE 9 MG/ML
100 INJECTION, SOLUTION INTRAVENOUS CONTINUOUS
Status: DISCONTINUED | OUTPATIENT
Start: 2024-01-12 | End: 2024-01-12

## 2024-01-12 RX ADMIN — SODIUM CHLORIDE 100 ML/HR: 9 INJECTION, SOLUTION INTRAVENOUS at 11:00

## 2024-01-12 RX ADMIN — SODIUM CHLORIDE 100 ML/HR: 9 INJECTION, SOLUTION INTRAVENOUS at 07:05

## 2024-01-12 ASSESSMENT — ENCOUNTER SYMPTOMS
ALLERGIC/IMMUNOLOGIC NEGATIVE: 1
CONSTITUTIONAL NEGATIVE: 1
NEUROLOGICAL NEGATIVE: 1
CHEST TIGHTNESS: 1
EYES NEGATIVE: 1
PSYCHIATRIC NEGATIVE: 1
MUSCULOSKELETAL NEGATIVE: 1
GASTROINTESTINAL NEGATIVE: 1
HEMATOLOGIC/LYMPHATIC NEGATIVE: 1
DIZZINESS: 0
PALPITATIONS: 1
LIGHT-HEADEDNESS: 0
SHORTNESS OF BREATH: 0
WEAKNESS: 0
ENDOCRINE NEGATIVE: 1

## 2024-01-12 ASSESSMENT — PAIN - FUNCTIONAL ASSESSMENT
PAIN_FUNCTIONAL_ASSESSMENT: 0-10

## 2024-01-12 ASSESSMENT — COLUMBIA-SUICIDE SEVERITY RATING SCALE - C-SSRS
2. HAVE YOU ACTUALLY HAD ANY THOUGHTS OF KILLING YOURSELF?: NO
6. HAVE YOU EVER DONE ANYTHING, STARTED TO DO ANYTHING, OR PREPARED TO DO ANYTHING TO END YOUR LIFE?: NO
1. IN THE PAST MONTH, HAVE YOU WISHED YOU WERE DEAD OR WISHED YOU COULD GO TO SLEEP AND NOT WAKE UP?: NO

## 2024-01-12 ASSESSMENT — PAIN SCALES - GENERAL
PAINLEVEL_OUTOF10: 0 - NO PAIN

## 2024-01-12 NOTE — POST-PROCEDURE NOTE
Physician Transition of Care Summary  Invasive Cardiovascular Lab    Procedure Date: 1/12/2024  Attending:    * Edgardo Martin - Primary  Resident/Fellow/Other Assistant: Surgeon(s) and Role:    Indications:   Pre-op Diagnosis     * Atherosclerosis of native coronary artery of native heart, unspecified whether angina present [I25.10]     * Angina pectoris (CMS/HCC) [I20.9]    Post-procedure diagnosis:   Post-op Diagnosis     * Atherosclerosis of native coronary artery of native heart, unspecified whether angina present [I25.10]     * Angina pectoris (CMS/HCC) [I20.9]    Procedure(s):   Left Heart Cath, With LV  33677 - IA CATH PLMT L HRT & ARTS W/NJX & ANGIO IMG S&I    IA CATH PLMT L HRT & ARTS W/NJX & ANGIO IMG S&I [82830]    Procedure Findings:   Widely patent previous LAD stent, widely patent previous circumflex stent, 70% stenosis of a small diagonal branch, normal right coronary artery, normal left ventricular function    Description of the Procedure:   Left heart catheterization coronary angiography left ventriculogram    Complications:   None    Stents/Implants:       Anticoagulation/Antiplatelet Plan:   None    Estimated Blood Loss:   1 mL    Anesthesia: Moderate Sedation Anesthesia Staff: No anesthesia staff entered.    Any Specimen(s) Removed:   Order Name Source Comment Collection Info Order Time   COAGULATION SCREEN Blood, Venous   1/12/2024  6:46 AM     Release result to [facility]   Immediate        BASIC METABOLIC PANEL Blood, Venous  Collected By: Angy Andino RN 1/12/2024  6:46 AM     Release result to [facility]   Immediate        CBC Blood, Venous  Collected By: Angy Andino RN 1/12/2024  6:46 AM     Release result to [facility]   Immediate            Disposition:   Medical therapy      Electronically signed by: Edgardo Martin MD, 1/12/2024 10:47 AM

## 2024-01-12 NOTE — H&P
History Of Present Illness  Israel Swartz is a 58 y.o. female with past medical history significant for CAD, s/p remote PCI, PVCs, HTN, DM 2, HLD, obesity with recent complaints of palpitations associated with chest pain.  She has taken nitroglycerin with relief.  She states the pain is similar to what she feels before needing an angioplasty.  She was seen by her cardiologist, Dr. Martin on 12/7/2023 and it was recommended she undergo LHC/possible PCI to further evaluate for progression of CAD.  She is at [facility] today for this procedure under the care of Dr. Martin.    Patient denies recent complaints of illness, fevers, chills, sweats or exposure to COVID-19.  Denies complaints of lightheadedness, dizziness, headache, chest pain, shortness of breath or palpitations at the present time.  Denies complaints of nausea, vomiting, diarrhea or constipation.  Denies complaints of lower extremity edema or weakness.     Past Medical History  Past Medical History:   Diagnosis Date    Coronary artery disease     Diabetes mellitus (CMS/HCC)     Disease of thyroid gland     Dizziness and giddiness 11/10/2021    Lightheadedness    Encounter for preprocedural cardiovascular examination 11/16/2021    Pre-operative cardiovascular examination    Hyperlipidemia     Hypertension     Non-Hodgkin lymphoma, unspecified, lymph nodes of head, face, and neck (CMS/HCC) 11/10/2021    Malignant lymphomas of lymph nodes of head, face, and neck    Otalgia, left ear 06/02/2020    Otalgia of left ear    Other acute postprocedural pain 02/18/2022    Acute postoperative pain    Personal history of other diseases of the musculoskeletal system and connective tissue 07/16/2019    History of muscle spasm    Personal history of other diseases of the nervous system and sense organs 01/01/2022    History of sciatica    Personal history of other endocrine, nutritional and metabolic disease 05/17/2022    History of hypercalcemia     Personal history of other specified conditions     History of polyuria    Personal history of other specified conditions 11/10/2021    History of shortness of breath    Personal history of other specified conditions 11/16/2021    History of chest pain    Personal history of other specified conditions 11/10/2021    History of palpitations    Personal history of urinary (tract) infections 11/24/2020    History of acute cystitis    Sialoadenitis, unspecified 05/05/2020    Sialadenitis    Unspecified asthma, uncomplicated 08/16/2022    Asthma, acute       Surgical History  Past Surgical History:   Procedure Laterality Date    CORONARY ANGIOPLASTY WITH STENT PLACEMENT Left     OTHER SURGICAL HISTORY  11/10/2021    Cyst excision    OTHER SURGICAL HISTORY  11/10/2021    Skin biopsy    OTHER SURGICAL HISTORY  11/10/2021    Cholecystectomy    OTHER SURGICAL HISTORY  11/10/2021    Colonoscopy    OTHER SURGICAL HISTORY  11/10/2021    [place] tooth extraction    OTHER SURGICAL HISTORY  11/10/2021    Sinus surgery    OTHER SURGICAL HISTORY  11/10/2021    Percutaneous transluminal coronary angioplasty    OTHER SURGICAL HISTORY  11/10/2021    Tonsillectomy    OTHER SURGICAL HISTORY  11/10/2021    Ganglion cyst excision    OTHER SURGICAL HISTORY  05/17/2022    Surgery        Social History  Never smoker  Denies current alcohol use  Denies illicit drug use    Family History  Family History   Problem Relation Name Age of Onset    Liver cancer Mother      Atrial fibrillation Father      Hypertension Father      Hyperlipidemia Father      Fainting Father          Allergies  Cefdinir, Codeine, Propoxyphene n-acetaminophen, Sulfa (sulfonamide antibiotics), and Tetracyclines    Review of Systems   Constitutional: Negative.    HENT: Negative.     Eyes: Negative.    Respiratory:  Positive for chest tightness. Negative for shortness of breath.    Cardiovascular:  Positive for chest pain and palpitations.   Gastrointestinal: Negative.   "  Endocrine: Negative.    Genitourinary: Negative.    Musculoskeletal: Negative.    Skin: Negative.    Allergic/Immunologic: Negative.    Neurological: Negative.  Negative for dizziness, weakness and light-headedness.   Hematological: Negative.    Psychiatric/Behavioral: Negative.        Physical Exam  Vitals reviewed.   Constitutional:       Appearance: Normal appearance.   HENT:      Head: Normocephalic and atraumatic.      Nose: Nose normal.      Mouth/Throat:      Mouth: Mucous membranes are moist.      Pharynx: Oropharynx is clear.   Eyes:      Pupils: Pupils are equal, round, and reactive to light.   Cardiovascular:      Rate and Rhythm: Normal rate and regular rhythm.      Pulses: Normal pulses.      Heart sounds: Normal heart sounds.   Pulmonary:      Effort: Pulmonary effort is normal.      Breath sounds: Normal breath sounds.   Abdominal:      General: Bowel sounds are normal.      Palpations: Abdomen is soft.   Musculoskeletal:         General: Normal range of motion.      Cervical back: Normal range of motion and neck supple.   Skin:     General: Skin is warm and dry.   Neurological:      General: No focal deficit present.      Mental Status: She is alert and oriented to person, place, and time.   Psychiatric:         Mood and Affect: Mood normal.         Behavior: Behavior normal.       Last Recorded Vitals  Blood pressure 134/81, pulse 91, temperature 36.4 °C (97.5 °F), resp. rate 18, height 1.676 m (5' 6\"), weight 89.5 kg (197 lb 5 oz), SpO2 99 %.    Relevant Results  Results for orders placed or performed during the hospital encounter of 01/12/24 (from the past 24 hour(s))   Basic Metabolic Panel   Result Value Ref Range    Glucose 138 (H) 74 - 99 mg/dL    Sodium 137 136 - 145 mmol/L    Potassium 4.4 3.5 - 5.3 mmol/L    Chloride 104 98 - 107 mmol/L    Bicarbonate 23 21 - 32 mmol/L    Anion Gap 14 10 - 20 mmol/L    Urea Nitrogen 23 6 - 23 mg/dL    Creatinine 1.07 (H) 0.50 - 1.05 mg/dL    eGFR 60 (L) " >60 mL/min/1.73m*2    Calcium 9.9 8.6 - 10.3 mg/dL   CBC   Result Value Ref Range    WBC 8.5 4.4 - 11.3 x10*3/uL    nRBC 0.0 0.0 - 0.0 /100 WBCs    RBC 4.99 4.00 - 5.20 x10*6/uL    Hemoglobin 15.4 12.0 - 16.0 g/dL    Hematocrit 44.3 36.0 - 46.0 %    MCV 89 80 - 100 fL    MCH 30.9 26.0 - 34.0 pg    MCHC 34.8 32.0 - 36.0 g/dL    RDW 12.9 11.5 - 14.5 %    Platelets 164 150 - 450 x10*3/uL   ECG 12 lead STAT   Result Value Ref Range    Ventricular Rate 87 BPM    Atrial Rate 87 BPM    CA Interval 180 ms    QRS Duration 88 ms    QT Interval 378 ms    QTC Calculation(Bazett) 454 ms    P Axis 32 degrees    R Axis -5 degrees    T Axis 33 degrees    QRS Count 15 beats    Q Onset 216 ms    P Onset 126 ms    P Offset 177 ms    T Offset 405 ms    QTC Fredericia 427 ms        ECG 12 lead STAT  Result Date: 1/12/2024  Normal sinus rhythm Normal ECG When compared with ECG of 07-FEB-2022 10:51, No significant change was found          Assessment/Plan   Angina/palpitations/PVCs/CAD/remote PCI   -C/possible PCI today under the care of Dr. Martin.  2.    Benign essential hypertension   -Stable  3.    Diabetes mellitus 2   -On oral medications.  4.    Hyperlipidemia   -On statin therapy  5.    Obesity   -BMI 31.8      I spent 35 minutes in the professional and overall care of this patient.      Caro Appiah, APRN-CNP

## 2024-01-12 NOTE — PRE-SEDATION DOCUMENTATION
Sedation Plan    ASA 2     Mallampati class: II.    Risks, benefits, and alternatives discussed with patient.

## 2024-01-12 NOTE — PROGRESS NOTES
Patient is stable status post LHC under the care of Dr. Martin.  Discussed results of procedure with patient and her family members.  Pictures provided.  Findings of the LHC revealed patent previous stents in the circumflex and LAD, 70% stenosis in a small diagonal artery, normal RCA and a normal LVEF.  Medical management is advised.  Patient is scheduled to follow-up with Dr. Martin next month or sooner as needed.  All questions answered.  All verbalized understanding.

## 2024-02-15 ENCOUNTER — OFFICE VISIT (OUTPATIENT)
Dept: CARDIOLOGY | Facility: CLINIC | Age: 59
End: 2024-02-15
Payer: COMMERCIAL

## 2024-02-15 VITALS
WEIGHT: 202 LBS | BODY MASS INDEX: 32.47 KG/M2 | DIASTOLIC BLOOD PRESSURE: 60 MMHG | HEART RATE: 80 BPM | SYSTOLIC BLOOD PRESSURE: 116 MMHG | HEIGHT: 66 IN

## 2024-02-15 DIAGNOSIS — I20.9 ANGINA, CLASS III (CMS-HCC): ICD-10-CM

## 2024-02-15 DIAGNOSIS — R00.0 TACHYCARDIA: ICD-10-CM

## 2024-02-15 DIAGNOSIS — E78.2 MIXED HYPERLIPIDEMIA: ICD-10-CM

## 2024-02-15 DIAGNOSIS — I25.10 CAD S/P PERCUTANEOUS CORONARY ANGIOPLASTY: ICD-10-CM

## 2024-02-15 DIAGNOSIS — Z98.61 CAD S/P PERCUTANEOUS CORONARY ANGIOPLASTY: ICD-10-CM

## 2024-02-15 DIAGNOSIS — I20.9 ANGINA PECTORIS (CMS-HCC): ICD-10-CM

## 2024-02-15 DIAGNOSIS — E66.9 CLASS 1 OBESITY WITH BODY MASS INDEX (BMI) OF 32.0 TO 32.9 IN ADULT, UNSPECIFIED OBESITY TYPE, UNSPECIFIED WHETHER SERIOUS COMORBIDITY PRESENT: ICD-10-CM

## 2024-02-15 DIAGNOSIS — I49.3 FREQUENT UNIFOCAL PVCS: ICD-10-CM

## 2024-02-15 DIAGNOSIS — I10 PRIMARY HYPERTENSION: ICD-10-CM

## 2024-02-15 DIAGNOSIS — J45.20 MILD INTERMITTENT ASTHMA WITHOUT COMPLICATION (HHS-HCC): ICD-10-CM

## 2024-02-15 DIAGNOSIS — E11.9 TYPE 2 DIABETES MELLITUS WITHOUT COMPLICATION, UNSPECIFIED WHETHER LONG TERM INSULIN USE (MULTI): ICD-10-CM

## 2024-02-15 DIAGNOSIS — I10 BENIGN ESSENTIAL HYPERTENSION: ICD-10-CM

## 2024-02-15 DIAGNOSIS — I25.10 ATHEROSCLEROSIS OF CORONARY ARTERY, UNSPECIFIED VESSEL OR LESION TYPE, UNSPECIFIED WHETHER ANGINA PRESENT, UNSPECIFIED WHETHER NATIVE OR TRANSPLANTED HEART: ICD-10-CM

## 2024-02-15 DIAGNOSIS — Z87.891 FORMER SMOKER: ICD-10-CM

## 2024-02-15 PROCEDURE — 3074F SYST BP LT 130 MM HG: CPT | Performed by: INTERNAL MEDICINE

## 2024-02-15 PROCEDURE — 3008F BODY MASS INDEX DOCD: CPT | Performed by: INTERNAL MEDICINE

## 2024-02-15 PROCEDURE — 3078F DIAST BP <80 MM HG: CPT | Performed by: INTERNAL MEDICINE

## 2024-02-15 PROCEDURE — 99214 OFFICE O/P EST MOD 30 MIN: CPT | Performed by: INTERNAL MEDICINE

## 2024-02-15 PROCEDURE — 4010F ACE/ARB THERAPY RXD/TAKEN: CPT | Performed by: INTERNAL MEDICINE

## 2024-02-15 PROCEDURE — 1036F TOBACCO NON-USER: CPT | Performed by: INTERNAL MEDICINE

## 2024-02-15 RX ORDER — ISOSORBIDE MONONITRATE 60 MG/1
60 TABLET, EXTENDED RELEASE ORAL DAILY
Qty: 90 TABLET | Refills: 3 | Status: SHIPPED | OUTPATIENT
Start: 2024-02-15 | End: 2024-03-10 | Stop reason: WASHOUT

## 2024-02-15 RX ORDER — ISOSORBIDE MONONITRATE 30 MG/1
30 TABLET, EXTENDED RELEASE ORAL DAILY
Qty: 90 TABLET | Refills: 3 | Status: SHIPPED | OUTPATIENT
Start: 2024-02-15 | End: 2024-03-10 | Stop reason: WASHOUT

## 2024-02-15 NOTE — PROGRESS NOTES
Referred by Dr. Lopez ref. provider found provider found for   Chief Complaint   Patient presents with    Post-Cath        History of Present Illness  Israel wSartz is a 58 y.o. year old female patient with history of coronary disease.  Had cardiac catheterization showed moderate 70% lesion of diagonal branch.  This been managed medically however she continued to have occasional episode of chest pain.  I had a lengthy discussion with the patient about potential PCI of the diagonal to relieve symptoms.  She wants to try first to increase the Imdur to 90 mg p.o. daily.  Based on that we will determine whether she needs a future PCI or not.  Patient understood.  Will follow-up as scheduled    Past Medical History  Past Medical History:   Diagnosis Date    Coronary artery disease     Diabetes mellitus (CMS/HCC)     Disease of thyroid gland     Dizziness and giddiness 11/10/2021    Lightheadedness    Encounter for preprocedural cardiovascular examination 11/16/2021    Pre-operative cardiovascular examination    Hyperlipidemia     Hypertension     Non-Hodgkin lymphoma, unspecified, lymph nodes of head, face, and neck (CMS/HCC) 11/10/2021    Malignant lymphomas of lymph nodes of head, face, and neck    Otalgia, left ear 06/02/2020    Otalgia of left ear    Other acute postprocedural pain 02/18/2022    Acute postoperative pain    Personal history of other diseases of the musculoskeletal system and connective tissue 07/16/2019    History of muscle spasm    Personal history of other diseases of the nervous system and sense organs 01/01/2022    History of sciatica    Personal history of other endocrine, nutritional and metabolic disease 05/17/2022    History of hypercalcemia    Personal history of other specified conditions     History of polyuria    Personal history of other specified conditions 11/10/2021    History of shortness of breath    Personal history of other specified conditions 11/16/2021    History of chest pain     Personal history of other specified conditions 11/10/2021    History of palpitations    Personal history of urinary (tract) infections 11/24/2020    History of acute cystitis    Sialoadenitis, unspecified 05/05/2020    Sialadenitis    Unspecified asthma, uncomplicated 08/16/2022    Asthma, acute       Social History  Social History     Tobacco Use    Smoking status: Never    Smokeless tobacco: Never   Substance Use Topics    Alcohol use: Not Currently    Drug use: Never       Family History     Family History   Problem Relation Name Age of Onset    Liver cancer Mother      Atrial fibrillation Father      Hypertension Father      Hyperlipidemia Father      Fainting Father         Review of Systems  As per HPI, all other systems reviewed and negative.    Allergies:  Allergies   Allergen Reactions    Cefdinir Hives and Itching    Codeine Nausea Only     pt states she can take synthetic codeine like in Vicodin    Propoxyphene N-Acetaminophen Nausea Only    Sulfa (Sulfonamide Antibiotics) Nausea Only    Tetracyclines Hives and Itching        Outpatient Medications:  Current Outpatient Medications   Medication Instructions    albuterol 90 mcg/actuation inhaler 2 puffs, inhalation, Every 4 hours PRN    aspirin 81 mg EC tablet 1 tablet, oral, Daily    Cartia  mg, oral, Daily    cetirizine (ZYRTEC) 10 mg, oral, Daily    cholecalciferol (VITAMIN D-3) 2,000 Units, oral, Daily    cyanocobalamin, vitamin B-12, (Vitamin B-12) 1,000 mcg tablet extended release 1 tablet, oral, Daily    cyclobenzaprine (FLEXERIL) 10 mg, oral, 3 times daily PRN    fenofibrate (TRICOR) 145 mg, oral, Daily    fluoride, sodium, 1.1 % gel dental    fluticasone (Flonase) 50 mcg/actuation nasal spray 1 spray, Each Nostril, Daily    glimepiride (Amaryl) 2 mg tablet 1 tablet, oral, Daily with breakfast    isosorbide mononitrate ER (IMDUR) 60 mg, oral, Daily    levothyroxine (SYNTHROID, LEVOXYL) 88 mcg, oral, Daily    lisinopril 2.5 mg, oral, Daily     lovastatin (MEVACOR) 20 mg, oral, Nightly    magnesium oxide (Mag-Ox) 400 mg (241.3 mg magnesium) tablet 1 tablet, oral, 2 times daily    metFORMIN XR (Glucophage-XR) 500 mg 24 hr tablet 2 tablets, oral, 2 times daily    metoprolol tartrate (LOPRESSOR) 100 mg, oral, 2 times daily    Mounjaro 5 mg, subcutaneous, Weekly    nitroglycerin (NITROSTAT) 0.4 mg, sublingual, Every 5 min PRN    pioglitazone (ACTOS) 30 mg, oral, Daily    spironolactone (ALDACTONE) 25 mg, oral, Daily    triamcinolone (Kenalog) 0.1 % ointment 1 Application, Topical, 2 times daily         Vitals:  Vitals:    02/15/24 0915   BP: 116/60   Pulse: 80       Physical Exam:  Physical Exam  Vitals and nursing note reviewed.   Constitutional:       Appearance: Normal appearance. She is normal weight.   HENT:      Head: Normocephalic and atraumatic.   Eyes:      Extraocular Movements: Extraocular movements intact.      Pupils: Pupils are equal, round, and reactive to light.   Cardiovascular:      Rate and Rhythm: Normal rate and regular rhythm.      Pulses: Normal pulses.   Pulmonary:      Effort: Pulmonary effort is normal.      Breath sounds: Normal breath sounds.   Musculoskeletal:      Cervical back: Normal range of motion.      Right lower leg: No edema.      Left lower leg: No edema.   Skin:     General: Skin is warm and dry.   Neurological:      General: No focal deficit present.      Mental Status: She is alert and oriented to person, place, and time.             Assessment/Plan   Diagnoses and all orders for this visit:  CAD S/P percutaneous coronary angioplasty  Benign essential hypertension  Angina pectoris (CMS/HCC)  Frequent unifocal PVCs  Primary hypertension  Mixed hyperlipidemia  Tachycardia  Class 1 obesity with body mass index (BMI) of 32.0 to 32.9 in adult, unspecified obesity type, unspecified whether serious comorbidity present  Type 2 diabetes mellitus without complication, unspecified whether long term insulin use (CMS/HCC)  Mild  intermittent asthma without complication  Former smoker          Edgardo Martin MD [facility]  Interventional Cardiology   of [facility]     Thank you for allowing me to participate in the care of this patient. Please do not hesitate to contact me with any further questions or concerns.

## 2024-02-15 NOTE — PATIENT INSTRUCTIONS
Patient to follow up in 6 months with Dr. Edgardo Martin MD [facility]     Please return call to us if you decide you want to try to proceed stenting the small branch artery you have.   , nurse Mni     No other changes today.   Continue same medications and treatments.   Patient educated on proper medication use.   Patient educated on risk factor modification.   Please bring any lab results from other providers / physicians to your next appointment.     Please bring all medicines, vitamins, and herbal supplements with you when you come to the office.     Prescriptions will not be filled unless you are compliant with your follow up appointments or have a follow up appointment scheduled as per instruction of your physician. Refills should be requested at the time of your visit.    IMin RN am scribing for and in the presence of Dr. Edgardo Martin MD [facility]

## 2024-02-27 ENCOUNTER — PHARMACY VISIT (OUTPATIENT)
Dept: PHARMACY | Facility: CLINIC | Age: 59
End: 2024-02-27
Payer: COMMERCIAL

## 2024-02-27 PROCEDURE — RXMED WILLOW AMBULATORY MEDICATION CHARGE

## 2024-03-01 ENCOUNTER — TELEPHONE (OUTPATIENT)
Dept: CARDIOLOGY | Facility: CLINIC | Age: 59
End: 2024-03-01
Payer: COMMERCIAL

## 2024-03-01 DIAGNOSIS — Z98.61 CAD S/P PERCUTANEOUS CORONARY ANGIOPLASTY: ICD-10-CM

## 2024-03-01 DIAGNOSIS — I20.9 ANGINA, CLASS III (CMS-HCC): ICD-10-CM

## 2024-03-01 DIAGNOSIS — I25.10 CAD S/P PERCUTANEOUS CORONARY ANGIOPLASTY: ICD-10-CM

## 2024-03-01 NOTE — TELEPHONE ENCOUNTER
Pt left message asking to speak with Min.      Return call placed to pt, advised that Min is not available today.  Per pt she states that there is an order for her to have a stent.  Per pt she spoke to a lady in scheduling and was asked for the CPT code.  Per pt if she can get the CPT code they can pre cert the stent.  Advised that I will forward message to Min LANZA RN.      Routed to Min LANZA RN

## 2024-03-06 ENCOUNTER — OFFICE VISIT (OUTPATIENT)
Dept: PRIMARY CARE | Facility: CLINIC | Age: 59
End: 2024-03-06
Payer: COMMERCIAL

## 2024-03-06 VITALS
OXYGEN SATURATION: 97 % | SYSTOLIC BLOOD PRESSURE: 116 MMHG | DIASTOLIC BLOOD PRESSURE: 74 MMHG | TEMPERATURE: 96 F | RESPIRATION RATE: 16 BRPM | HEART RATE: 71 BPM | WEIGHT: 198 LBS | BODY MASS INDEX: 31.82 KG/M2 | HEIGHT: 66 IN

## 2024-03-06 DIAGNOSIS — M62.838 MUSCLE SPASM: ICD-10-CM

## 2024-03-06 PROCEDURE — 3078F DIAST BP <80 MM HG: CPT | Performed by: FAMILY MEDICINE

## 2024-03-06 PROCEDURE — 3074F SYST BP LT 130 MM HG: CPT | Performed by: FAMILY MEDICINE

## 2024-03-06 PROCEDURE — 3008F BODY MASS INDEX DOCD: CPT | Performed by: FAMILY MEDICINE

## 2024-03-06 PROCEDURE — 4010F ACE/ARB THERAPY RXD/TAKEN: CPT | Performed by: FAMILY MEDICINE

## 2024-03-06 PROCEDURE — 99213 OFFICE O/P EST LOW 20 MIN: CPT | Performed by: FAMILY MEDICINE

## 2024-03-06 PROCEDURE — 1036F TOBACCO NON-USER: CPT | Performed by: FAMILY MEDICINE

## 2024-03-06 RX ORDER — CYCLOBENZAPRINE HCL 10 MG
10 TABLET ORAL 3 TIMES DAILY PRN
Qty: 90 TABLET | Refills: 3 | Status: SHIPPED | OUTPATIENT
Start: 2024-03-06

## 2024-03-06 NOTE — PROGRESS NOTES
Subjective   Patient ID: Israel Swartz is a 58 y.o. female who presents for Sinusitis (3 month follow up ).  HPI  Pleasant 58-year-old female history coronary deficiency is here to recheck on her allergic rhinitis and chronic sinusitis off antibiotics this time and takes her nasal steroids normal saline is done fairly well  Has an upcoming cardiology appointment to perhaps consider a 1 more coronary artery stent.  They did up her isosorbide to 60 mg daily instead of 30.  Remains on her diltiazem as well as beta-blocker and ACE inhibitor aspirin Avacor.  Remains on her Synthroid 88 mcg daily as well as magnesium.  No Silgen palpitations but get intermittent tightness in her chest which will be worked up with repeat cath soon  Is followed by endocrinologist takes her metformin regularly as well as her dose and Amaryl and GLP-1 agonist.  Otherwise sugars been fairly stable at home takes supplemental B12 vitamin D  Tightness in her lower neck area and shoulders like to have a refill of her Flexeril without any significant side effects.  Patient is observing a low-salt low carbohydrate and low-fat diet and remains on Mevacor.    Review of Systems   Constitutional:  Negative for fatigue, fever and unexpected weight change.   HENT:  Positive for rhinorrhea. Negative for ear pain, sinus pressure, sinus pain and sore throat.    Eyes:  Negative for visual disturbance.   Respiratory:  Negative for cough, chest tightness and shortness of breath.    Cardiovascular:  Positive for chest pain. Negative for palpitations and leg swelling.   Gastrointestinal:  Negative for abdominal pain, blood in stool and vomiting.   Genitourinary:  Positive for frequency. Negative for dysuria, hematuria and pelvic pain.   Musculoskeletal:  Positive for arthralgias, myalgias and neck stiffness.   Skin:  Negative for rash.   Neurological:  Positive for headaches. Negative for weakness and light-headedness.   Hematological:  Negative for  "adenopathy.   Psychiatric/Behavioral:  Negative for behavioral problems, dysphoric mood, sleep disturbance and suicidal ideas.        Objective   /74 (BP Location: Left arm, Patient Position: Sitting, BP Cuff Size: Adult)   Pulse 71   Temp 35.6 °C (96 °F) (Temporal)   Resp 16   Ht 1.676 m (5' 6\")   Wt 89.8 kg (198 lb)   SpO2 97%   BMI 31.96 kg/m²           Physical Exam  Vital signs reviewed and normal pulse ox is normal  ENT eyes clear PERRLA bilaterally nose shows mild septal deviation with rhinorrhea only no sinus tenderness TMs scarred retracted without redness oral exam is benign  Neck neck is supple without worrisome masses or adenopathy no JVD no Kussmaul sign thyroid is normal  Chest chest is clear anteriorly and posteriorly  Cardiovascular very soft grade 1/2 over 6 systolic ejection murmur otherwise no diastolic murmurs, gallop, or ectopy  Peripheral vascular no symmetric or asymmetric edema pulses are intact  Skin-no rashes petechiae or jaundice  Neuro-- cranial nerves intact there is no new focal deficits of the upper and lower extremities      Assessment/Plan   Problem List Items Addressed This Visit    None  Visit Diagnoses       Muscle spasm            Muscle spasm in the lower paravertebral muscles of the neck will place back on Flexeril and if needed basis tolerated this without side effects  Continue low-salt low-fat low carbohydrate diet  Continue to follow with endocrinology  Follow-up with cardiology with impending repeat cath with potential establishment of stent  Continue above cardiac medicines continue above diabetic medicines  Follow-up in 3 months to review cardiac workup otherwise continue follow-up with the above subspecialist  Continue nasal steroids normal saline if needed and no need for antibiotics at this time  @discharge  The above diagnosis and treatment plan was discussed with the patient patient will continue appropriate diet and exercise as reviewed  Patient will " recheck earlier if any interval problems of significance or clinical worsening of the above problems.  Agrees above surveillance.  All question were addressed regarding above meds

## 2024-03-07 NOTE — TELEPHONE ENCOUNTER
Per Dr. Edgardo Martin MD [facility] patient to plan PCI diaganol branch with him in near future.   Patient to hold Metformin AM of procedure. Continue all other medications, including ASA, the day of the procedure.   Routine lab work on admission.     Orders placed and sent to Dr. Edgardo Martin MD [facility] for signature.     Patient is aware and agreeable.

## 2024-03-10 ASSESSMENT — ENCOUNTER SYMPTOMS
SINUS PRESSURE: 0
LIGHT-HEADEDNESS: 0
UNEXPECTED WEIGHT CHANGE: 0
HEADACHES: 1
FATIGUE: 0
NECK STIFFNESS: 1
SORE THROAT: 0
WEAKNESS: 0
BLOOD IN STOOL: 0
FREQUENCY: 1
HEMATURIA: 0
DYSPHORIC MOOD: 0
SINUS PAIN: 0
ABDOMINAL PAIN: 0
CHEST TIGHTNESS: 0
DYSURIA: 0
PALPITATIONS: 0
ADENOPATHY: 0
SLEEP DISTURBANCE: 0
FEVER: 0
COUGH: 0
RHINORRHEA: 1
VOMITING: 0
MYALGIAS: 1
ARTHRALGIAS: 1
SHORTNESS OF BREATH: 0

## 2024-03-18 ENCOUNTER — LAB (OUTPATIENT)
Dept: LAB | Facility: LAB | Age: 59
End: 2024-03-18
Payer: COMMERCIAL

## 2024-03-18 DIAGNOSIS — I20.9 ANGINA, CLASS III (CMS-HCC): ICD-10-CM

## 2024-03-18 DIAGNOSIS — Z98.61 CAD S/P PERCUTANEOUS CORONARY ANGIOPLASTY: ICD-10-CM

## 2024-03-18 DIAGNOSIS — I25.10 CAD S/P PERCUTANEOUS CORONARY ANGIOPLASTY: ICD-10-CM

## 2024-03-18 LAB
ANION GAP SERPL CALC-SCNC: 12 MMOL/L (ref 10–20)
BUN SERPL-MCNC: 21 MG/DL (ref 6–23)
CALCIUM SERPL-MCNC: 9.6 MG/DL (ref 8.6–10.3)
CHLORIDE SERPL-SCNC: 102 MMOL/L (ref 98–107)
CO2 SERPL-SCNC: 26 MMOL/L (ref 21–32)
CREAT SERPL-MCNC: 0.98 MG/DL (ref 0.5–1.05)
EGFRCR SERPLBLD CKD-EPI 2021: 66 ML/MIN/1.73M*2
ERYTHROCYTE [DISTWIDTH] IN BLOOD BY AUTOMATED COUNT: 12.9 % (ref 11.5–14.5)
GLUCOSE SERPL-MCNC: 222 MG/DL (ref 74–99)
HCT VFR BLD AUTO: 44.7 % (ref 36–46)
HGB BLD-MCNC: 14.7 G/DL (ref 12–16)
INR PPP: 1.1 (ref 0.9–1.1)
MCH RBC QN AUTO: 30.1 PG (ref 26–34)
MCHC RBC AUTO-ENTMCNC: 32.9 G/DL (ref 32–36)
MCV RBC AUTO: 91 FL (ref 80–100)
NRBC BLD-RTO: 0 /100 WBCS (ref 0–0)
PLATELET # BLD AUTO: 180 X10*3/UL (ref 150–450)
POTASSIUM SERPL-SCNC: 4.2 MMOL/L (ref 3.5–5.3)
PROTHROMBIN TIME: 12.1 SECONDS (ref 9.8–12.8)
RBC # BLD AUTO: 4.89 X10*6/UL (ref 4–5.2)
SODIUM SERPL-SCNC: 136 MMOL/L (ref 136–145)
WBC # BLD AUTO: 7.1 X10*3/UL (ref 4.4–11.3)

## 2024-03-18 PROCEDURE — 36415 COLL VENOUS BLD VENIPUNCTURE: CPT

## 2024-03-18 PROCEDURE — 85027 COMPLETE CBC AUTOMATED: CPT

## 2024-03-18 PROCEDURE — 85610 PROTHROMBIN TIME: CPT

## 2024-03-18 PROCEDURE — 80048 BASIC METABOLIC PNL TOTAL CA: CPT

## 2024-03-19 PROCEDURE — RXMED WILLOW AMBULATORY MEDICATION CHARGE

## 2024-03-20 RX ORDER — ASPIRIN 325 MG
325 TABLET ORAL ONCE
Status: CANCELLED | OUTPATIENT
Start: 2024-03-20 | End: 2024-03-20

## 2024-03-22 ENCOUNTER — HOSPITAL ENCOUNTER (OUTPATIENT)
Facility: HOSPITAL | Age: 59
Setting detail: OUTPATIENT SURGERY
Discharge: HOME | End: 2024-03-22
Attending: INTERNAL MEDICINE | Admitting: INTERNAL MEDICINE
Payer: COMMERCIAL

## 2024-03-22 ENCOUNTER — PHARMACY VISIT (OUTPATIENT)
Dept: PHARMACY | Facility: CLINIC | Age: 59
End: 2024-03-22
Payer: COMMERCIAL

## 2024-03-22 ENCOUNTER — HOSPITAL ENCOUNTER (OUTPATIENT)
Dept: CARDIOLOGY | Facility: HOSPITAL | Age: 59
Setting detail: OUTPATIENT SURGERY
Discharge: HOME | End: 2024-03-22
Payer: COMMERCIAL

## 2024-03-22 ENCOUNTER — APPOINTMENT (OUTPATIENT)
Dept: CARDIOLOGY | Facility: HOSPITAL | Age: 59
End: 2024-03-22
Payer: COMMERCIAL

## 2024-03-22 VITALS
HEIGHT: 67 IN | RESPIRATION RATE: 20 BRPM | BODY MASS INDEX: 31.87 KG/M2 | WEIGHT: 203.04 LBS | OXYGEN SATURATION: 97 % | SYSTOLIC BLOOD PRESSURE: 120 MMHG | TEMPERATURE: 97 F | HEART RATE: 85 BPM | DIASTOLIC BLOOD PRESSURE: 67 MMHG

## 2024-03-22 DIAGNOSIS — Z98.61 CAD S/P PERCUTANEOUS CORONARY ANGIOPLASTY: Primary | ICD-10-CM

## 2024-03-22 DIAGNOSIS — I25.10 CAD S/P PERCUTANEOUS CORONARY ANGIOPLASTY: Primary | ICD-10-CM

## 2024-03-22 DIAGNOSIS — I20.9 ANGINA, CLASS III (CMS-HCC): ICD-10-CM

## 2024-03-22 PROCEDURE — 99152 MOD SED SAME PHYS/QHP 5/>YRS: CPT | Performed by: INTERNAL MEDICINE

## 2024-03-22 PROCEDURE — 93571 IV DOP VEL&/PRESS C FLO 1ST: CPT | Performed by: INTERNAL MEDICINE

## 2024-03-22 PROCEDURE — 99222 1ST HOSP IP/OBS MODERATE 55: CPT | Performed by: NURSE PRACTITIONER

## 2024-03-22 PROCEDURE — 99153 MOD SED SAME PHYS/QHP EA: CPT | Performed by: INTERNAL MEDICINE

## 2024-03-22 PROCEDURE — G0269 OCCLUSIVE DEVICE IN VEIN ART: HCPCS | Mod: TC,59 | Performed by: INTERNAL MEDICINE

## 2024-03-22 PROCEDURE — C1874 STENT, COATED/COV W/DEL SYS: HCPCS | Performed by: INTERNAL MEDICINE

## 2024-03-22 PROCEDURE — C1769 GUIDE WIRE: HCPCS | Performed by: INTERNAL MEDICINE

## 2024-03-22 PROCEDURE — 2500000005 HC RX 250 GENERAL PHARMACY W/O HCPCS: Performed by: INTERNAL MEDICINE

## 2024-03-22 PROCEDURE — 7100000009 HC PHASE TWO TIME - INITIAL BASE CHARGE: Performed by: INTERNAL MEDICINE

## 2024-03-22 PROCEDURE — 2720000007 HC OR 272 NO HCPCS: Performed by: INTERNAL MEDICINE

## 2024-03-22 PROCEDURE — 85347 COAGULATION TIME ACTIVATED: CPT | Performed by: INTERNAL MEDICINE

## 2024-03-22 PROCEDURE — 93005 ELECTROCARDIOGRAM TRACING: CPT | Mod: 59

## 2024-03-22 PROCEDURE — 92928 PRQ TCAT PLMT NTRAC ST 1 LES: CPT | Performed by: INTERNAL MEDICINE

## 2024-03-22 PROCEDURE — C1760 CLOSURE DEV, VASC: HCPCS | Performed by: INTERNAL MEDICINE

## 2024-03-22 PROCEDURE — 7100000010 HC PHASE TWO TIME - EACH INCREMENTAL 1 MINUTE: Performed by: INTERNAL MEDICINE

## 2024-03-22 PROCEDURE — 2500000004 HC RX 250 GENERAL PHARMACY W/ HCPCS (ALT 636 FOR OP/ED): Performed by: INTERNAL MEDICINE

## 2024-03-22 PROCEDURE — 2500000001 HC RX 250 WO HCPCS SELF ADMINISTERED DRUGS (ALT 637 FOR MEDICARE OP): Performed by: INTERNAL MEDICINE

## 2024-03-22 PROCEDURE — C1725 CATH, TRANSLUMIN NON-LASER: HCPCS | Performed by: INTERNAL MEDICINE

## 2024-03-22 PROCEDURE — C1887 CATHETER, GUIDING: HCPCS | Performed by: INTERNAL MEDICINE

## 2024-03-22 PROCEDURE — 2780000003 HC OR 278 NO HCPCS: Performed by: INTERNAL MEDICINE

## 2024-03-22 PROCEDURE — 2500000001 HC RX 250 WO HCPCS SELF ADMINISTERED DRUGS (ALT 637 FOR MEDICARE OP): Performed by: NURSE PRACTITIONER

## 2024-03-22 PROCEDURE — 2550000001 HC RX 255 CONTRASTS: Performed by: INTERNAL MEDICINE

## 2024-03-22 PROCEDURE — 2500000004 HC RX 250 GENERAL PHARMACY W/ HCPCS (ALT 636 FOR OP/ED): Performed by: NURSE PRACTITIONER

## 2024-03-22 PROCEDURE — 93010 ELECTROCARDIOGRAM REPORT: CPT | Performed by: INTERNAL MEDICINE

## 2024-03-22 PROCEDURE — 93454 CORONARY ARTERY ANGIO S&I: CPT | Performed by: INTERNAL MEDICINE

## 2024-03-22 PROCEDURE — C9600 PERC DRUG-EL COR STENT SING: HCPCS | Performed by: INTERNAL MEDICINE

## 2024-03-22 DEVICE — STENT ONYXNG20018UX ONYX 2.00X18RX
Type: IMPLANTABLE DEVICE | Site: CORONARY | Status: FUNCTIONAL
Brand: ONYX FRONTIER™

## 2024-03-22 RX ORDER — MORPHINE SULFATE 2 MG/ML
2 INJECTION, SOLUTION INTRAMUSCULAR; INTRAVENOUS
Status: DISCONTINUED | OUTPATIENT
Start: 2024-03-22 | End: 2024-03-22 | Stop reason: HOSPADM

## 2024-03-22 RX ORDER — HEPARIN SODIUM 1000 [USP'U]/ML
INJECTION, SOLUTION INTRAVENOUS; SUBCUTANEOUS AS NEEDED
Status: DISCONTINUED | OUTPATIENT
Start: 2024-03-22 | End: 2024-03-22 | Stop reason: HOSPADM

## 2024-03-22 RX ORDER — FENTANYL CITRATE 50 UG/ML
INJECTION, SOLUTION INTRAMUSCULAR; INTRAVENOUS AS NEEDED
Status: DISCONTINUED | OUTPATIENT
Start: 2024-03-22 | End: 2024-03-22 | Stop reason: HOSPADM

## 2024-03-22 RX ORDER — CLOPIDOGREL BISULFATE 300 MG/1
TABLET, FILM COATED ORAL AS NEEDED
Status: DISCONTINUED | OUTPATIENT
Start: 2024-03-22 | End: 2024-03-22 | Stop reason: HOSPADM

## 2024-03-22 RX ORDER — MIDAZOLAM HYDROCHLORIDE 1 MG/ML
INJECTION, SOLUTION INTRAMUSCULAR; INTRAVENOUS AS NEEDED
Status: DISCONTINUED | OUTPATIENT
Start: 2024-03-22 | End: 2024-03-22 | Stop reason: HOSPADM

## 2024-03-22 RX ORDER — NITROGLYCERIN 0.4 MG/1
0.4 TABLET SUBLINGUAL EVERY 5 MIN PRN
Status: DISCONTINUED | OUTPATIENT
Start: 2024-03-22 | End: 2024-03-22 | Stop reason: HOSPADM

## 2024-03-22 RX ORDER — SODIUM CHLORIDE 9 MG/ML
100 INJECTION, SOLUTION INTRAVENOUS CONTINUOUS
Status: DISCONTINUED | OUTPATIENT
Start: 2024-03-22 | End: 2024-03-22

## 2024-03-22 RX ORDER — CLOPIDOGREL BISULFATE 75 MG/1
75 TABLET ORAL DAILY
Status: DISCONTINUED | OUTPATIENT
Start: 2024-03-23 | End: 2024-03-22 | Stop reason: HOSPADM

## 2024-03-22 RX ORDER — LIDOCAINE HYDROCHLORIDE 20 MG/ML
INJECTION, SOLUTION INFILTRATION; PERINEURAL AS NEEDED
Status: DISCONTINUED | OUTPATIENT
Start: 2024-03-22 | End: 2024-03-22 | Stop reason: HOSPADM

## 2024-03-22 RX ORDER — ALUMINUM HYDROXIDE, MAGNESIUM HYDROXIDE, AND SIMETHICONE 1200; 120; 1200 MG/30ML; MG/30ML; MG/30ML
30 SUSPENSION ORAL 4 TIMES DAILY PRN
Status: DISCONTINUED | OUTPATIENT
Start: 2024-03-22 | End: 2024-03-22 | Stop reason: HOSPADM

## 2024-03-22 RX ORDER — NAPROXEN SODIUM 220 MG/1
81 TABLET, FILM COATED ORAL DAILY
Status: DISCONTINUED | OUTPATIENT
Start: 2024-03-23 | End: 2024-03-22 | Stop reason: HOSPADM

## 2024-03-22 RX ORDER — ISOSORBIDE MONONITRATE 30 MG/1
30 TABLET, EXTENDED RELEASE ORAL DAILY
COMMUNITY

## 2024-03-22 RX ORDER — ACETAMINOPHEN 325 MG/1
650 TABLET ORAL EVERY 6 HOURS PRN
Status: DISCONTINUED | OUTPATIENT
Start: 2024-03-22 | End: 2024-03-22 | Stop reason: HOSPADM

## 2024-03-22 RX ORDER — CLOPIDOGREL BISULFATE 75 MG/1
75 TABLET ORAL DAILY
Qty: 30 TABLET | Refills: 11 | Status: SHIPPED | OUTPATIENT
Start: 2024-03-23

## 2024-03-22 RX ADMIN — ALUMINUM HYDROXIDE, MAGNESIUM HYDROXIDE, AND DIMETHICONE 30 ML: 200; 20; 200 SUSPENSION ORAL at 10:48

## 2024-03-22 RX ADMIN — SODIUM CHLORIDE 100 ML/HR: 9 INJECTION, SOLUTION INTRAVENOUS at 07:20

## 2024-03-22 RX ADMIN — SODIUM CHLORIDE 100 ML/HR: 9 INJECTION, SOLUTION INTRAVENOUS at 10:48

## 2024-03-22 ASSESSMENT — ENCOUNTER SYMPTOMS
EYES NEGATIVE: 1
CONSTITUTIONAL NEGATIVE: 1
SHORTNESS OF BREATH: 0
MUSCULOSKELETAL NEGATIVE: 1
CHEST TIGHTNESS: 1
LIGHT-HEADEDNESS: 0
ALLERGIC/IMMUNOLOGIC NEGATIVE: 1
GASTROINTESTINAL NEGATIVE: 1
PSYCHIATRIC NEGATIVE: 1
HEMATOLOGIC/LYMPHATIC NEGATIVE: 1
WEAKNESS: 0
PALPITATIONS: 0
NEUROLOGICAL NEGATIVE: 1
DIZZINESS: 0
ENDOCRINE NEGATIVE: 1

## 2024-03-22 NOTE — POST-PROCEDURE NOTE
Physician Transition of Care Summary  Invasive Cardiovascular Lab    Procedure Date: 3/22/2024  Attending:    * Edgardo Martin - Primary  Resident/Fellow/Other Assistant: Surgeon(s) and Role:    Indications:   Pre-op Diagnosis     * CAD S/P percutaneous coronary angioplasty [I25.10, Z98.61]     * Angina, class III (CMS/HCC) [I20.9]    Post-procedure diagnosis:   Post-op Diagnosis     * CAD S/P percutaneous coronary angioplasty [I25.10, Z98.61]     * Angina, class III (CMS/HCC) [I20.9]    Procedure(s):   PCI JEAN Stent- Coronary  59968 - NM PRQ TRLUML CORONARY STENT W/ANGIO ONE ART/BRNCH    IFR (Instant Wave Free Ration)    NM CATH PLACE/CORON ANGIO, IMG SUPER/INTERP,W LEFT HEART VENTRICULOGRAPHY [J22712]    Procedure Findings:   80% stenosis of a diagonal branch, 50% stenosis of circumflex, successful PTCA drug-eluting stents of the diagonal, IFR of the circumflex showed 0.96    Description of the Procedure:   PTCA of drug-eluting stents of the diagonal, IFR of the circumflex    Complications:   None    Stents/Implants:   Cardiovascular Implants       Stent    Stent, [place] Jean, 2.00 X 18rx - [id number] - Implanted        Inventory item: STENT, EDILBERTO FRONTIER JEAN, 2.00 X 18RX Model/Cat number: [id number]    : [employer] Lot number: [id number]    Device identifier: [id number]        As of 3/22/2024       Status: Implanted                              Anticoagulation/Antiplatelet Plan:   Intravenous heparin, Plavix load    Estimated Blood Loss:   * No values recorded between 3/22/2024  9:00 AM and 3/22/2024 10:04 AM *    Anesthesia: Moderate Sedation Anesthesia Staff: No anesthesia staff entered.    Any Specimen(s) Removed:   No specimens collected during this procedure.    Disposition:   Dual antiplatelet therapy      Electronically signed by: Edgardo Martin MD, 3/22/2024 10:04 AM

## 2024-03-22 NOTE — SIGNIFICANT EVENT
Pt back from procedure, right groin site stable, no bleeding or hematoma noted, no complaints of pain, 2+ pedal pulse,  at bedside

## 2024-03-22 NOTE — Clinical Note
Angioplasty of the left anterior descending diagonal lesion. Inflation 1: Pressure = 12 sonja; Duration = 11 sec. Inflation 2: Pressure = 12 sonja; Duration = 4 sec.

## 2024-03-22 NOTE — Clinical Note
Inflation 1: Pressure = 16 sonja; Duration = 5 sec. Inflation 2: Pressure = 16 sonja; Duration = 11 sec.

## 2024-03-22 NOTE — PROGRESS NOTES
Patient is stable status post PCI under the care of Dr. Mratin.  Discussed results of procedure with patient and her .  Pictures provided.  Patient underwent successful PCI with 1 CANDIDO placed to the diagonal artery reducing that 70 to 80% lesion down to 0% stenosis.  She tolerated the procedure well.  Please see procedural report for complete details.  Patient was initiated on DAPT with aspirin 81 mg daily and Plavix 75 mg daily.  She will be discharged home later today barring no complications.  She will be scheduled to follow-up with Dr. Martin in 1 to 2 weeks.  All questions answered.  Both verbalized understanding.

## 2024-03-22 NOTE — DISCHARGE INSTRUCTIONS
CARDIAC CATHETERIZATION DISCHARGE INSTRUCTIONS     FOR SUDDEN AND SEVERE CHEST PAIN, SHORTNESS OF BREATH, EXCESSIVE BLEEDING, SIGNS OF STROKE, OR CHANGES IN MENTAL STATUS YOU SHOULD CALL 911 IMMEDIATELY.     If your provider has prescribed aspirin and/or clopidogrel (Plavix), or prasugrel (Effient), or ticagrelor (Brilinta), DO NOT STOP THESE MEDICATIONS for any reason without talking to your cardiologist first. If any of these were prescribed, you must take them every day without missing a single dose. If you are getting low on these medications, contact your provider immediately for a refill.     FOR NEXT 24 HOURS  - Upon discharge, you should return home and rest for the remainder of the day and evening. You do not have to stay on bed rest but should not be very active.  It is recommended a responsible adult be with you for the first 24 hours after the procedure.    - No driving for 24 hours after procedure. Please arrange for someone to drive you home from the hospital today.     - Do not drive, operate machinery, or use power tools for 24 hours after your procedure.     - Do not make any legal decisions for 24 hours after your procedure.     - Do not drink alcoholic beverages for 24 hours after your procedure.    WOUND CARE   *FOR FEMORAL (LEG) ACCESS*  ·      Avoid heavy lifting (over 10 pounds) for 7 days, squatting or excessive bending for 2 days, and strenuous exercise for 7 days.  ·      No submerged bathing, swimming, or hot tubs for the next 7 days, or until fully healed.  ·      Avoid sexual activity for 3-4 days until any groin discomfort has ceased.       - The transparent dressing should be removed from the site 24 hours after the procedure.  Wash the site gently with soap and water. Rinse well and pat dry. Keep the area clean and dry. You may apply a Band-Aid to the site. Avoid lotions, ointments, or powders until fully healed.     - You may shower the day after your procedure.      - It is  normal to notice a small bruise around the puncture site and/or a small grape sized or smaller lump. Any large bruising or large lump warrants a call to the office.     - If bleeding should occur or rapid swelling, lay down and apply pressure to the affected area for 10 minutes.  If the bleeding stops notify your physician.  If there is a large amount of bleeding or spurting of blood CALL 911 immediately.  DO NOT drive yourself to the hospital.    - You may experience some tenderness, bruising or minimal inflammation.  If you have any concerns, you may contact the Cath Lab or if any of these symptoms become excessive, contact your cardiologist or go to the emergency room.     OTHER INSTRUCTIONS  - You may take acetaminophen (Tylenol) as directed for discomfort.  If pain is not relieved with acetaminophen (Tylenol), contact your doctor.    - If you notice or experience any of the following, you should notify your doctor or seek medical attention  Chest pain or discomfort  Change in mental status or weakness in extremities.  Dizziness, light headedness, or feeling faint.  Change in the site where the procedure was performed, such as bleeding or an increased area of bruising or swelling.  Tingling, numbness, pain, or coolness in the leg/arm beyond the site where the procedure was performed.  Signs of infection (i.e. shaking chills, temperature > 100 degrees Fahrenheit, warmth, redness) in the leg/arm area where the procedure was performed.  Changes in urination   Bloody or black stools  Vomiting blood  Severe nose bleeds  Any excessive bleeding    - If you DO NOT have an appointment with your cardiologist within 2-4 weeks following your procedure, please contact their office.

## 2024-03-22 NOTE — H&P
History Of Present Illness  Israel Swartz is a 59 y.o. female presenting with past medical history significant for coronary artery disease, s/p remote PCI, HTN, HLD, DM 2, palpitations, family history of CAD with recent complaints of chest pain.  She underwent LHC on January 12, 2024 under the care of Dr. Martin which revealed patent previous stents, 70% stenosis in the first diagonal, luminal irregularities elsewhere and an LVEF of 55%.  It was recommended to treat this vessel medically, however if patient continues to have symptoms would consider PCI.  Patient continues to have intermittent chest pain despite uptitration of her isosorbide.  It was recommended she undergo PCI of the diagonal.  She is at [facility] today for this procedure under the care of Dr. Martin.    Patient denies recent complaints of illness, fevers, chills, sweats or exposure to COVID-19.  Denies complaints of lightheadedness, dizziness, chest pain, shortness of breath or palpitations at the present time.  Denies complaints of nausea, vomiting, diarrhea or constipation.  Denies complaints of lower extremity edema or weakness.     Past Medical History  Past Medical History:   Diagnosis Date    Coronary artery disease     Diabetes mellitus (CMS/HCC)     Disease of thyroid gland     Dizziness and giddiness 11/10/2021    Lightheadedness    Encounter for preprocedural cardiovascular examination 11/16/2021    Pre-operative cardiovascular examination    Hyperlipidemia     Hypertension     Non-Hodgkin lymphoma, unspecified, lymph nodes of head, face, and neck (CMS/HCC) 11/10/2021    Malignant lymphomas of lymph nodes of head, face, and neck    Otalgia, left ear 06/02/2020    Otalgia of left ear    Other acute postprocedural pain 02/18/2022    Acute postoperative pain    Personal history of other diseases of the musculoskeletal system and connective tissue 07/16/2019    History of muscle spasm    Personal history of other diseases of  the nervous system and sense organs 01/01/2022    History of sciatica    Personal history of other endocrine, nutritional and metabolic disease 05/17/2022    History of hypercalcemia    Personal history of other specified conditions     History of polyuria    Personal history of other specified conditions 11/10/2021    History of shortness of breath    Personal history of other specified conditions 11/16/2021    History of chest pain    Personal history of other specified conditions 11/10/2021    History of palpitations    Personal history of urinary (tract) infections 11/24/2020    History of acute cystitis    Sialoadenitis, unspecified 05/05/2020    Sialadenitis    Unspecified asthma, uncomplicated 08/16/2022    Asthma, acute     Surgical History  Past Surgical History:   Procedure Laterality Date    CARDIAC CATHETERIZATION N/A 1/12/2024    Procedure: Left Heart Cath, With LV;  Surgeon: Edgardo Martin MD;  Location: [facility];  Service: Cardiovascular;  Laterality: N/A;    CORONARY ANGIOPLASTY WITH STENT PLACEMENT Left     OTHER SURGICAL HISTORY  11/10/2021    Cyst excision    OTHER SURGICAL HISTORY  11/10/2021    Skin biopsy    OTHER SURGICAL HISTORY  11/10/2021    Cholecystectomy    OTHER SURGICAL HISTORY  11/10/2021    Colonoscopy    OTHER SURGICAL HISTORY  11/10/2021    [place] tooth extraction    OTHER SURGICAL HISTORY  11/10/2021    Sinus surgery    OTHER SURGICAL HISTORY  11/10/2021    Percutaneous transluminal coronary angioplasty    OTHER SURGICAL HISTORY  11/10/2021    Tonsillectomy    OTHER SURGICAL HISTORY  11/10/2021    Ganglion cyst excision    OTHER SURGICAL HISTORY  05/17/2022    Surgery     Social History  She reports that she has never smoked. She has never used smokeless tobacco. She reports that she does not currently use alcohol. She reports that she does not use drugs.    Family History  Family History   Problem Relation Name Age of Onset    Liver cancer Mother      Atrial  fibrillation Father      Hypertension Father      Hyperlipidemia Father      Fainting Father       Allergies  Cefdinir, Codeine, Propoxyphene n-acetaminophen, Sulfa (sulfonamide antibiotics), and Tetracyclines    Review of Systems   Constitutional: Negative.    HENT: Negative.     Eyes: Negative.    Respiratory:  Positive for chest tightness. Negative for shortness of breath.    Cardiovascular:  Positive for chest pain. Negative for palpitations.   Gastrointestinal: Negative.    Endocrine: Negative.    Genitourinary: Negative.    Musculoskeletal: Negative.    Skin: Negative.    Allergic/Immunologic: Negative.    Neurological: Negative.  Negative for dizziness, weakness and light-headedness.   Hematological: Negative.    Psychiatric/Behavioral: Negative.       Physical Exam  Vitals reviewed.   Constitutional:       Appearance: Normal appearance.   HENT:      Head: Normocephalic and atraumatic.      Nose: Nose normal.      Mouth/Throat:      Mouth: Mucous membranes are moist.      Pharynx: Oropharynx is clear.   Eyes:      Pupils: Pupils are equal, round, and reactive to light.   Cardiovascular:      Rate and Rhythm: Normal rate and regular rhythm.      Pulses: Normal pulses.      Heart sounds: Normal heart sounds.   Pulmonary:      Effort: Pulmonary effort is normal.      Breath sounds: Normal breath sounds.   Abdominal:      General: Bowel sounds are normal.      Palpations: Abdomen is soft.   Musculoskeletal:         General: Normal range of motion.      Cervical back: Normal range of motion and neck supple.   Skin:     General: Skin is warm and dry.   Neurological:      General: No focal deficit present.      Mental Status: She is alert and oriented to person, place, and time.   Psychiatric:         Mood and Affect: Mood normal.         Behavior: Behavior normal.       Last Recorded Vitals  Blood pressure 127/66, pulse 79, temperature 36.1 °C (97 °F), temperature source Temporal, resp. rate 18, height 1.689 m (5'  "6.5\"), weight 92.1 kg (203 lb 0.7 oz), SpO2 98 %.    Relevant Results    Assessment/Plan   Chest Pain/CAD/s/p remote PCI/70% stenosis in the diagonal artery per [facility] 1/12/2024.   -Original plan to treat medically, however patient continues to have chest pain   -PCI of the LAD today under the care of Dr. Martin.  2.   Benign essential hypertension   -Stable  3.    Frequent PVCs  4.    Hyperlipidemia   -On statin therapy  5.    Diabetes mellitus 2  6.    Asthma  7.    Obesity   -BMI 32.3      I spent 55 minutes in the professional and overall care of this patient.      Caro Appiah, APRN-CNP    "

## 2024-03-22 NOTE — Clinical Note
Angioplasty of the left anterior descending diagonal lesion. Inflation 1: Pressure = 8 sonja; Duration = 5 sec. Inflation 2: Pressure = 12 sonja; Duration = 12 sec.

## 2024-03-22 NOTE — SIGNIFICANT EVENT
Discharge instructions given to pt including femoral site care, cardiac rehab, medications and follow up, pt verbalized understanding, cardiac rehab info sheet given

## 2024-03-24 LAB
ATRIAL RATE: 79 BPM
ATRIAL RATE: 79 BPM
P AXIS: 19 DEGREES
P AXIS: 46 DEGREES
P OFFSET: 170 MS
P OFFSET: 172 MS
P ONSET: 118 MS
P ONSET: 120 MS
PR INTERVAL: 192 MS
PR INTERVAL: 198 MS
Q ONSET: 214 MS
Q ONSET: 219 MS
QRS COUNT: 13 BEATS
QRS COUNT: 13 BEATS
QRS DURATION: 84 MS
QRS DURATION: 86 MS
QT INTERVAL: 402 MS
QT INTERVAL: 406 MS
QTC CALCULATION(BAZETT): 460 MS
QTC CALCULATION(BAZETT): 465 MS
QTC FREDERICIA: 440 MS
QTC FREDERICIA: 445 MS
R AXIS: 10 DEGREES
R AXIS: 20 DEGREES
T AXIS: 39 DEGREES
T AXIS: 64 DEGREES
T OFFSET: 415 MS
T OFFSET: 422 MS
VENTRICULAR RATE: 79 BPM
VENTRICULAR RATE: 79 BPM

## 2024-04-05 ENCOUNTER — OFFICE VISIT (OUTPATIENT)
Dept: CARDIOLOGY | Facility: CLINIC | Age: 59
End: 2024-04-05
Payer: COMMERCIAL

## 2024-04-05 VITALS
HEIGHT: 67 IN | WEIGHT: 206 LBS | BODY MASS INDEX: 32.33 KG/M2 | HEART RATE: 64 BPM | DIASTOLIC BLOOD PRESSURE: 60 MMHG | SYSTOLIC BLOOD PRESSURE: 98 MMHG

## 2024-04-05 DIAGNOSIS — E78.2 MIXED HYPERLIPIDEMIA: ICD-10-CM

## 2024-04-05 DIAGNOSIS — R00.0 TACHYCARDIA: ICD-10-CM

## 2024-04-05 DIAGNOSIS — Z87.891 FORMER SMOKER: ICD-10-CM

## 2024-04-05 DIAGNOSIS — I49.3 FREQUENT UNIFOCAL PVCS: ICD-10-CM

## 2024-04-05 DIAGNOSIS — I20.9 ANGINA PECTORIS (CMS-HCC): ICD-10-CM

## 2024-04-05 DIAGNOSIS — I10 PRIMARY HYPERTENSION: ICD-10-CM

## 2024-04-05 DIAGNOSIS — E66.9 CLASS 1 OBESITY WITH BODY MASS INDEX (BMI) OF 32.0 TO 32.9 IN ADULT, UNSPECIFIED OBESITY TYPE, UNSPECIFIED WHETHER SERIOUS COMORBIDITY PRESENT: ICD-10-CM

## 2024-04-05 DIAGNOSIS — I25.10 CAD S/P PERCUTANEOUS CORONARY ANGIOPLASTY: ICD-10-CM

## 2024-04-05 DIAGNOSIS — Z98.61 CAD S/P PERCUTANEOUS CORONARY ANGIOPLASTY: ICD-10-CM

## 2024-04-05 DIAGNOSIS — E11.9 TYPE 2 DIABETES MELLITUS WITHOUT COMPLICATION, UNSPECIFIED WHETHER LONG TERM INSULIN USE (MULTI): ICD-10-CM

## 2024-04-05 DIAGNOSIS — I10 BENIGN ESSENTIAL HYPERTENSION: ICD-10-CM

## 2024-04-05 PROCEDURE — 4010F ACE/ARB THERAPY RXD/TAKEN: CPT | Performed by: INTERNAL MEDICINE

## 2024-04-05 PROCEDURE — 3078F DIAST BP <80 MM HG: CPT | Performed by: INTERNAL MEDICINE

## 2024-04-05 PROCEDURE — 99213 OFFICE O/P EST LOW 20 MIN: CPT | Performed by: INTERNAL MEDICINE

## 2024-04-05 PROCEDURE — 3074F SYST BP LT 130 MM HG: CPT | Performed by: INTERNAL MEDICINE

## 2024-04-05 PROCEDURE — 1036F TOBACCO NON-USER: CPT | Performed by: INTERNAL MEDICINE

## 2024-04-05 PROCEDURE — 3008F BODY MASS INDEX DOCD: CPT | Performed by: INTERNAL MEDICINE

## 2024-04-05 NOTE — PROGRESS NOTES
Referred by Dr. Lopez ref. provider found provider found for   Chief Complaint   Patient presents with    Hospital Follow-up        History of Present Illness  Israel Swartz is a 59 y.o. year old female patient status post recent coronary angioplasty with stent plantation of a diagonal branch.  She is currently angina free.  She has been ambulating with no difficulty.  Angina completely resolved after the angioplasty.  I discussed with the patient in length that we need to continue dual antiplatelet therapy.  Will keep Plavix on for about 6 months.  She will continue cholesterol management and diabetes management.  She will follow-up with me in 6 months    Past Medical History  Past Medical History:   Diagnosis Date    Coronary artery disease     Diabetes mellitus (CMS/HCC)     Disease of thyroid gland     Dizziness and giddiness 11/10/2021    Lightheadedness    Encounter for preprocedural cardiovascular examination 11/16/2021    Pre-operative cardiovascular examination    Hyperlipidemia     Hypertension     Non-Hodgkin lymphoma, unspecified, lymph nodes of head, face, and neck (CMS/HCC) 11/10/2021    Malignant lymphomas of lymph nodes of head, face, and neck    Otalgia, left ear 06/02/2020    Otalgia of left ear    Other acute postprocedural pain 02/18/2022    Acute postoperative pain    Personal history of other diseases of the musculoskeletal system and connective tissue 07/16/2019    History of muscle spasm    Personal history of other diseases of the nervous system and sense organs 01/01/2022    History of sciatica    Personal history of other endocrine, nutritional and metabolic disease 05/17/2022    History of hypercalcemia    Personal history of other specified conditions     History of polyuria    Personal history of other specified conditions 11/10/2021    History of shortness of breath    Personal history of other specified conditions 11/16/2021    History of chest pain    Personal history of other  specified conditions 11/10/2021    History of palpitations    Personal history of urinary (tract) infections 11/24/2020    History of acute cystitis    Sialoadenitis, unspecified 05/05/2020    Sialadenitis    Unspecified asthma, uncomplicated 08/16/2022    Asthma, acute       Social History  Social History     Tobacco Use    Smoking status: Never    Smokeless tobacco: Never   Vaping Use    Vaping Use: Never used   Substance Use Topics    Alcohol use: Not Currently    Drug use: Never       Family History     Family History   Problem Relation Name Age of Onset    Liver cancer Mother      Atrial fibrillation Father      Hypertension Father      Hyperlipidemia Father      Fainting Father         Review of Systems  As per HPI, all other systems reviewed and negative.    Allergies:  Allergies   Allergen Reactions    Cefdinir Hives and Itching    Codeine Nausea Only     pt states she can take synthetic codeine like in Vicodin    Propoxyphene N-Acetaminophen Nausea Only    Sulfa (Sulfonamide Antibiotics) Nausea Only    Tetracyclines Hives and Itching        Outpatient Medications:  Current Outpatient Medications   Medication Instructions    albuterol 90 mcg/actuation inhaler 2 puffs, inhalation, Every 4 hours PRN    aspirin 81 mg EC tablet 1 tablet, oral, Daily    Cartia  mg, oral, Daily    cetirizine (ZYRTEC) 10 mg, oral, Daily    cholecalciferol (VITAMIN D-3) 2,000 Units, oral, Daily    clopidogrel (PLAVIX) 75 mg, oral, Daily    cyanocobalamin, vitamin B-12, (Vitamin B-12) 1,000 mcg tablet extended release 1 tablet, oral, Daily    cyclobenzaprine (FLEXERIL) 10 mg, oral, 3 times daily PRN    fenofibrate (TRICOR) 145 mg, oral, Daily    fluoride, sodium, 1.1 % gel dental    fluticasone (Flonase) 50 mcg/actuation nasal spray 1 spray, Each Nostril, Daily    glimepiride (Amaryl) 2 mg tablet 1 tablet, oral, Daily with breakfast    isosorbide mononitrate ER (IMDUR) 60 mg, oral, Daily    isosorbide mononitrate ER (IMDUR) 30  mg, oral, Daily, Do not crush or chew. In addition to 60 mg tablet    levothyroxine (SYNTHROID, LEVOXYL) 88 mcg, oral, Daily    lisinopril 2.5 mg, oral, Daily    lovastatin (MEVACOR) 20 mg, oral, Nightly    magnesium oxide (Mag-Ox) 400 mg (241.3 mg magnesium) tablet 1 tablet, oral, 2 times daily    metFORMIN XR (Glucophage-XR) 500 mg 24 hr tablet 2 tablets, oral, 2 times daily    metoprolol tartrate (LOPRESSOR) 100 mg, oral, 2 times daily    Mounjaro 5 mg, subcutaneous, Once Weekly    nitroglycerin (NITROSTAT) 0.4 mg, sublingual, Every 5 min PRN    pioglitazone (ACTOS) 30 mg, oral, Daily    spironolactone (ALDACTONE) 25 mg, oral, Daily    triamcinolone (Kenalog) 0.1 % ointment 1 Application, Topical, 2 times daily         Vitals:  Vitals:    04/05/24 1310   BP: 98/60   Pulse: 64       Physical Exam:  Physical Exam  Vitals and nursing note reviewed.   Constitutional:       Appearance: Normal appearance.   HENT:      Head: Normocephalic.   Eyes:      Pupils: Pupils are equal, round, and reactive to light.   Cardiovascular:      Rate and Rhythm: Normal rate and regular rhythm.      Pulses: Normal pulses.      Heart sounds: Normal heart sounds.   Pulmonary:      Effort: Pulmonary effort is normal.      Breath sounds: Normal breath sounds.   Musculoskeletal:         General: Normal range of motion.      Cervical back: Normal range of motion.   Skin:     General: Skin is dry.   Neurological:      General: No focal deficit present.      Mental Status: She is alert and oriented to person, place, and time.   Psychiatric:         Behavior: Behavior is cooperative.             Assessment/Plan   Diagnoses and all orders for this visit:  CAD S/P percutaneous coronary angioplasty  Benign essential hypertension  Angina pectoris (CMS/HCC)  Frequent unifocal PVCs  Primary hypertension  Mixed hyperlipidemia  Tachycardia  Class 1 obesity with body mass index (BMI) of 32.0 to 32.9 in adult, unspecified obesity type, unspecified whether  serious comorbidity present  Type 2 diabetes mellitus without complication, unspecified whether long term insulin use (CMS/[facility])  Former smoker          Edgardo Martin MD [facility]  Interventional Cardiology   of [facility]     Thank you for allowing me to participate in the care of this patient. Please do not hesitate to contact me with any further questions or concerns.

## 2024-04-19 PROCEDURE — RXMED WILLOW AMBULATORY MEDICATION CHARGE

## 2024-04-22 ENCOUNTER — PHARMACY VISIT (OUTPATIENT)
Dept: PHARMACY | Facility: CLINIC | Age: 59
End: 2024-04-22
Payer: COMMERCIAL

## 2024-05-14 PROCEDURE — RXMED WILLOW AMBULATORY MEDICATION CHARGE

## 2024-05-17 ENCOUNTER — PHARMACY VISIT (OUTPATIENT)
Dept: PHARMACY | Facility: CLINIC | Age: 59
End: 2024-05-17
Payer: COMMERCIAL

## 2024-06-11 PROCEDURE — RXMED WILLOW AMBULATORY MEDICATION CHARGE

## 2024-06-13 ENCOUNTER — PHARMACY VISIT (OUTPATIENT)
Dept: PHARMACY | Facility: CLINIC | Age: 59
End: 2024-06-13
Payer: COMMERCIAL

## 2024-06-26 DIAGNOSIS — I10 BENIGN ESSENTIAL HYPERTENSION: ICD-10-CM

## 2024-06-26 RX ORDER — METOPROLOL TARTRATE 100 MG/1
100 TABLET ORAL 2 TIMES DAILY
Qty: 180 TABLET | Refills: 3 | Status: SHIPPED | OUTPATIENT
Start: 2024-06-26 | End: 2025-06-26

## 2024-06-26 NOTE — TELEPHONE ENCOUNTER
Received request for prescription refill for patient.  Patient follows with Dr. Edgardo Martin MD, formerly [facility]     Request is for metoprolol  Is patient currently on medication- yes    Last OV- 4/5/24  Next OV- 10/8/24    Pended for signing and sent to provider.

## 2024-07-08 ENCOUNTER — APPOINTMENT (OUTPATIENT)
Dept: PRIMARY CARE | Facility: CLINIC | Age: 59
End: 2024-07-08
Payer: COMMERCIAL

## 2024-07-08 VITALS
DIASTOLIC BLOOD PRESSURE: 72 MMHG | TEMPERATURE: 96 F | WEIGHT: 202 LBS | HEART RATE: 84 BPM | RESPIRATION RATE: 16 BRPM | OXYGEN SATURATION: 97 % | SYSTOLIC BLOOD PRESSURE: 114 MMHG | HEIGHT: 67 IN | BODY MASS INDEX: 31.71 KG/M2

## 2024-07-08 DIAGNOSIS — J30.1 SEASONAL ALLERGIC RHINITIS DUE TO POLLEN: ICD-10-CM

## 2024-07-08 DIAGNOSIS — J32.0 CHRONIC MAXILLARY SINUSITIS: Primary | ICD-10-CM

## 2024-07-08 DIAGNOSIS — J45.20 MILD INTERMITTENT ASTHMA WITHOUT COMPLICATION (HHS-HCC): ICD-10-CM

## 2024-07-08 PROCEDURE — 3074F SYST BP LT 130 MM HG: CPT | Performed by: FAMILY MEDICINE

## 2024-07-08 PROCEDURE — 3078F DIAST BP <80 MM HG: CPT | Performed by: FAMILY MEDICINE

## 2024-07-08 PROCEDURE — 3008F BODY MASS INDEX DOCD: CPT | Performed by: FAMILY MEDICINE

## 2024-07-08 PROCEDURE — 99213 OFFICE O/P EST LOW 20 MIN: CPT | Performed by: FAMILY MEDICINE

## 2024-07-08 PROCEDURE — 4010F ACE/ARB THERAPY RXD/TAKEN: CPT | Performed by: FAMILY MEDICINE

## 2024-07-08 RX ORDER — AZITHROMYCIN 250 MG/1
TABLET, FILM COATED ORAL
Qty: 6 TABLET | Refills: 0 | Status: SHIPPED | OUTPATIENT
Start: 2024-07-08

## 2024-07-08 NOTE — PROGRESS NOTES
Subjective   Patient ID: Israel Swartz is a 59 y.o. female who presents for Sinusitis (4 month follow up ).  HPI  Pleasant 59-year-old female history of coronary insufficiency doing quite well in regards to her asthma coronary insufficiency dyslipidemia.  Chronic cardiac meds were reviewed.  Otherwise did have a recent stent remains on aspirin Plavix in addition to her lisinopril diltiazem metoprolol but is off her isosorbide.  Does take Mevacor and Tricor for dyslipidemia  Recently despite her Zyrtec her rhinorrhea is continued is deep left ear pressure and into the left infraorbital area no high fever shaking chills COVID test was negative no increasing shortness of breath cough or wheezing or change in sputum production.  No orthopnea peripheral edema but otherwise concerned more with secondary headache earache and worsening sinus thickness and congestion.  Chronic meds reviewed no reported side effects.  Happy she is done well without pain or tightness since her last cardiac stent  Review of Systems   Constitutional:  Negative for fatigue, fever and unexpected weight change.   HENT:  Positive for ear pain, rhinorrhea and sinus pressure. Negative for dental problem, ear discharge, hearing loss, sore throat, tinnitus and voice change.    Eyes:  Negative for visual disturbance.   Respiratory:  Negative for cough, chest tightness and shortness of breath.    Cardiovascular:  Negative for chest pain, palpitations and leg swelling.   Gastrointestinal:  Negative for abdominal pain, blood in stool and vomiting.   Genitourinary:  Positive for frequency. Negative for dysuria and hematuria.   Musculoskeletal:  Positive for myalgias. Negative for arthralgias.   Skin:  Negative for rash.   Neurological:  Positive for headaches. Negative for seizures, weakness, light-headedness and numbness.   Psychiatric/Behavioral:  Negative for behavioral problems, confusion, decreased concentration, sleep disturbance and suicidal ideas.  "       Objective   /72 (BP Location: Right arm, Patient Position: Sitting, BP Cuff Size: Adult)   Pulse 84   Temp 35.6 °C (96 °F) (Temporal)   Resp 16   Ht 1.689 m (5' 6.5\")   Wt 91.6 kg (202 lb)   SpO2 97%   BMI 32.12 kg/m²           Physical Exam  Vital sign review normal pulse ox is good pulse is regular with good blood pressure  ENT eyes clear PERRL bilaterally nose with engorged turbinates left greater than right with tenderness left maxillary sinus deep left ear to retraction air-fluid level but no true hyperemia of either TM.  Canals patent mastoids unremarkable oral exam is benign except for mild posterior injection   neck neck is all without masses adenopathy bruits or rigid  Pulmonary-chest clear without wheezing rales or rhonchi  Cardiovascular RSR in good with no ectopy no S3 gallop  Peripheral vascular no symmetric or asymmetric edema distal pulses are intact  Skin no rashes petechiae or jaundice  Mood-mood is stable without anxiety depressive or cognitive issues    Assessment/Plan   Problem List Items Addressed This Visit    None  Patient will continue the above low-salt low carbohydrate and low-fat diet and follow-up with her endocrinologist and cardiologist.  No side effects on above medicines we will continue these  Will add Z-Filiberto for above early sinusitis and serous otitis media otherwise continue nasal saline as well as her Zyrtec at night may add very low-dose 30 mg of Sudafed since she is tolerated this without tachycardia or palpitations or heart skips  Tylenol if needed but otherwise follow-up with the above subspecialist and I will follow-up during the jorge whether early October continue healthy routines gets labs through cardiology as well as endocrinologist.  @discharge  The above diagnosis and treatment plan was discussed with the patient patient will continue appropriate diet and exercise as reviewed  Patient will recheck earlier if any interval problems of significance or " clinical worsening of the above problems.  Agrees above surveillance.  All question were addressed regarding above meds

## 2024-07-09 PROCEDURE — RXMED WILLOW AMBULATORY MEDICATION CHARGE

## 2024-07-10 ENCOUNTER — PHARMACY VISIT (OUTPATIENT)
Dept: PHARMACY | Facility: CLINIC | Age: 59
End: 2024-07-10
Payer: COMMERCIAL

## 2024-07-11 PROBLEM — J45.909 ASTHMA, ACUTE (HHS-HCC): Status: RESOLVED | Noted: 2023-05-02 | Resolved: 2024-07-11

## 2024-07-11 ASSESSMENT — ENCOUNTER SYMPTOMS
LIGHT-HEADEDNESS: 0
FATIGUE: 0
SINUS PRESSURE: 1
HEADACHES: 1
FEVER: 0
COUGH: 0
WEAKNESS: 0
SHORTNESS OF BREATH: 0
VOMITING: 0
CHEST TIGHTNESS: 0
HEMATURIA: 0
BLOOD IN STOOL: 0
CONFUSION: 0
RHINORRHEA: 1
FREQUENCY: 1
PALPITATIONS: 0
SEIZURES: 0
NUMBNESS: 0
SORE THROAT: 0
ABDOMINAL PAIN: 0
DYSURIA: 0
DECREASED CONCENTRATION: 0
MYALGIAS: 1
SLEEP DISTURBANCE: 0
UNEXPECTED WEIGHT CHANGE: 0
VOICE CHANGE: 0
ARTHRALGIAS: 0

## 2024-08-05 PROCEDURE — RXMED WILLOW AMBULATORY MEDICATION CHARGE

## 2024-08-08 ENCOUNTER — PHARMACY VISIT (OUTPATIENT)
Dept: PHARMACY | Facility: CLINIC | Age: 59
End: 2024-08-08
Payer: COMMERCIAL

## 2024-08-12 DIAGNOSIS — I10 BENIGN ESSENTIAL HYPERTENSION: ICD-10-CM

## 2024-08-12 DIAGNOSIS — R00.0 TACHYCARDIA: Primary | ICD-10-CM

## 2024-08-12 RX ORDER — DILTIAZEM HYDROCHLORIDE 120 MG/1
120 CAPSULE, EXTENDED RELEASE ORAL DAILY
Qty: 90 CAPSULE | Refills: 3 | Status: SHIPPED | OUTPATIENT
Start: 2024-08-12 | End: 2024-08-15 | Stop reason: RX

## 2024-08-12 NOTE — TELEPHONE ENCOUNTER
Received request for prescription refill for patient.  Patient follows with Dr. Edgardo Martin MD, [facility]     Request is for Cartia  Is patient currently on medication- yes    Last OV- 4/5/24  Next OV- 10/8/24    Pended for signing and sent to provider.

## 2024-08-14 NOTE — TELEPHONE ENCOUNTER
Pharmacy called and LM stating the brand name Cartia is not available and they are unsure when it will be back in stock. They can do generic if okay with provider. Routed to Anita Metcalf LPN in the absence of Min Gr RN

## 2024-08-15 ENCOUNTER — APPOINTMENT (OUTPATIENT)
Dept: CARDIOLOGY | Facility: CLINIC | Age: 59
End: 2024-08-15
Payer: COMMERCIAL

## 2024-08-15 NOTE — TELEPHONE ENCOUNTER
Per Edgardo Martin M.D. , may switch to generic Diltiazem Er 120 mg daily. Call returned to [facility] and advised.

## 2024-08-16 RX ORDER — DILTIAZEM HYDROCHLORIDE 120 MG/1
120 CAPSULE, COATED, EXTENDED RELEASE ORAL DAILY
Start: 2024-08-16 | End: 2025-08-16

## 2024-08-26 DIAGNOSIS — E78.2 MIXED HYPERLIPIDEMIA: ICD-10-CM

## 2024-08-26 NOTE — TELEPHONE ENCOUNTER
Received request for prescription refill for patient.  Patient follows with Dr. Edgardo Martin MD, [facility]     Request is for lovastatin  Is patient currently on medication- yes    Last OV- 4/5/24  Next OV- 10/8/24    Pended for signing and sent to provider.

## 2024-08-27 RX ORDER — LOVASTATIN 20 MG/1
20 TABLET ORAL NIGHTLY
Qty: 90 TABLET | Refills: 3 | Status: SHIPPED | OUTPATIENT
Start: 2024-08-27 | End: 2025-08-27

## 2024-09-03 DIAGNOSIS — E78.5 DYSLIPIDEMIA: ICD-10-CM

## 2024-09-03 PROCEDURE — RXMED WILLOW AMBULATORY MEDICATION CHARGE

## 2024-09-03 RX ORDER — FENOFIBRATE 145 MG/1
145 TABLET, FILM COATED ORAL DAILY
Qty: 90 TABLET | Refills: 3 | Status: SHIPPED | OUTPATIENT
Start: 2024-09-03 | End: 2025-09-03

## 2024-09-03 NOTE — TELEPHONE ENCOUNTER
Received request for prescription refill for patient.  Patient follows with Dr. Edgardo Martin MD, [facility]     Request is for fenofibrate  Is patient currently on medication- yes    Last OV- 4/5/24  Next OV- 10/8/24    Pended for signing and sent to provider.

## 2024-09-05 ENCOUNTER — PHARMACY VISIT (OUTPATIENT)
Dept: PHARMACY | Facility: CLINIC | Age: 59
End: 2024-09-05
Payer: COMMERCIAL

## 2024-10-01 ENCOUNTER — APPOINTMENT (OUTPATIENT)
Dept: PRIMARY CARE | Facility: CLINIC | Age: 59
End: 2024-10-01
Payer: COMMERCIAL

## 2024-10-01 VITALS
TEMPERATURE: 96.4 F | SYSTOLIC BLOOD PRESSURE: 108 MMHG | BODY MASS INDEX: 31.39 KG/M2 | RESPIRATION RATE: 16 BRPM | HEIGHT: 67 IN | OXYGEN SATURATION: 97 % | WEIGHT: 200 LBS | DIASTOLIC BLOOD PRESSURE: 70 MMHG | HEART RATE: 76 BPM

## 2024-10-01 DIAGNOSIS — E78.2 MIXED HYPERLIPIDEMIA: ICD-10-CM

## 2024-10-01 DIAGNOSIS — Z85.72 HISTORY OF NON-HODGKIN'S LYMPHOMA: ICD-10-CM

## 2024-10-01 DIAGNOSIS — I25.10 CAD S/P PERCUTANEOUS CORONARY ANGIOPLASTY: ICD-10-CM

## 2024-10-01 DIAGNOSIS — E04.2 NONTOXIC MULTINODULAR GOITER: ICD-10-CM

## 2024-10-01 DIAGNOSIS — E11.9 TYPE 2 DIABETES MELLITUS WITHOUT COMPLICATION, WITHOUT LONG-TERM CURRENT USE OF INSULIN (MULTI): ICD-10-CM

## 2024-10-01 DIAGNOSIS — I10 BENIGN ESSENTIAL HYPERTENSION: Primary | ICD-10-CM

## 2024-10-01 DIAGNOSIS — Z98.61 CAD S/P PERCUTANEOUS CORONARY ANGIOPLASTY: ICD-10-CM

## 2024-10-01 DIAGNOSIS — E03.9 ACQUIRED HYPOTHYROIDISM: ICD-10-CM

## 2024-10-01 PROCEDURE — 3078F DIAST BP <80 MM HG: CPT | Performed by: FAMILY MEDICINE

## 2024-10-01 PROCEDURE — 3008F BODY MASS INDEX DOCD: CPT | Performed by: FAMILY MEDICINE

## 2024-10-01 PROCEDURE — 4010F ACE/ARB THERAPY RXD/TAKEN: CPT | Performed by: FAMILY MEDICINE

## 2024-10-01 PROCEDURE — 1036F TOBACCO NON-USER: CPT | Performed by: FAMILY MEDICINE

## 2024-10-01 PROCEDURE — 99213 OFFICE O/P EST LOW 20 MIN: CPT | Performed by: FAMILY MEDICINE

## 2024-10-01 PROCEDURE — 3074F SYST BP LT 130 MM HG: CPT | Performed by: FAMILY MEDICINE

## 2024-10-01 NOTE — PROGRESS NOTES
"Subjective   Patient ID: Israel Swartz is a 59 y.o. female who presents for Sinusitis (3 month follow up ).  HPI  Pleasant 59-year-old with a history of prior Hodgkin lymphoma within very well been in remission  Does follow-up with [facility] endocrinology for type 2 diabetes on Actos and Amaryl and metformin and Mounjaro at this time A1c has improved down to 7.2  Followed by cardiology status post tenting and remains on her Tricor and Mevacor for dyslipidemia  Remains on both diltiazem and beta-blockers for PVCs hypertension and coronary sufficiency.  Gratefully no recent chest pain shortness of breath after her last stent.  Does take Plavix for her prior stenting.  Otherwise mild rhinorrhea but otherwise no significant teeth ache cheek ache earache or sore throat at this time no shortness of breath at the this time  Review of Systems   Constitutional:  Negative for fatigue, fever and unexpected weight change.   HENT:  Positive for rhinorrhea. Negative for sinus pressure, sinus pain and sore throat.    Eyes:  Negative for visual disturbance.   Respiratory:  Negative for cough, chest tightness and shortness of breath.    Cardiovascular:  Negative for chest pain, palpitations and leg swelling.   Gastrointestinal:  Negative for abdominal pain, blood in stool and vomiting.   Genitourinary:  Positive for frequency. Negative for dysuria, flank pain and hematuria.   Musculoskeletal:  Positive for myalgias. Negative for arthralgias.   Skin:  Negative for rash.   Neurological:  Negative for weakness, light-headedness, numbness and headaches.   Psychiatric/Behavioral:  Negative for behavioral problems, sleep disturbance and suicidal ideas.        Objective   /70 (BP Location: Right arm, Patient Position: Sitting, BP Cuff Size: Large adult)   Pulse 76   Temp 35.8 °C (96.4 °F) (Temporal)   Resp 16   Ht 1.689 m (5' 6.5\")   Wt 90.7 kg (200 lb)   SpO2 97%   BMI 31.80 kg/m²               Component  Ref Range " & Units 1 mo ago  (8/23/24) 11 mo ago  (10/12/23) 1 yr ago  (12/27/22) 2 yr ago  (7/15/22) 2 yr ago  (12/27/21) 3 yr ago  (7/14/21) 3 yr ago  (1/21/21)   Hemoglobin A1C  4.3 - 5.6 % 7.2 High  8.0 High  CM 7.4 High  CM 8.8 High  CM 8.3 High  CM 7.4 High  CM 8.1 High  CM   Comment: American Diabetes Association guidelines indicate that patients with HgbA1c in the range 5.7-6.4% are at increased risk for development of diabetes, and intervention by lifestyle modification may be beneficial. HgbA1c greater or equal to 6.5% is considered diagnostic of diabetes.   Estimated Average Glucose  mg/dL 1                 [facility]  Outside Information      suggestion  Information displayed in this report may not trend or trigger automated decision support.      Contains abnormal data COMP METABOLIC PANEL  Order: [id number]  Component  Ref Range & Units 1 mo ago   Protein, Total  6.3 - 8.0 g/dL 7.3   Albumin  3.9 - 4.9 g/dL 4.6   Calcium, Total  8.5 - 10.2 mg/dL 9.9   Bilirubin, Total  0.2 - 1.3 mg/dL 0.5   Alkaline Phosphatase  34 - 123 U/L 58   AST  13 - 35 U/L 34   ALT  7 - 38 U/L 31   Glucose  74 - 99 mg/dL 146 High    Comment: The American Diabetes Association (ADA) provides guidance for cutoff values for fasting glucose and random glucose. The ADA defines fasting as no caloric intake for at least 8 hours. Fasting plasma glucose results between 100 to 125 mg/dL indicate increased risk for diabetes (prediabetes).  Fasting plasma glucose results greater than or equal to 126 mg/dL meet the criteria for diagnosis of diabetes. In the absence of unequivocal hyperglycemia, results should be confirmed by repeat testing. In a patient with classic symptoms of hyperglycemia or hyperglycemic crisis, random plasma glucose results greater than or equal to 200 mg/dL meet the criteria for diagnosis of diabetes.  Reference: Standards of Medical Care in Diabetes 2016, American Diabetes Association. Diabetes Care. 2016.39(Suppl 1).    BUN  7 - 21 mg/dL 20   Creatinine  0.58 - 0.96 mg/dL 0.89   Sodium  136 - 144 mmol/L 132 Low    Potassium  3.7 - 5.1 mmol/L 4.5   Chloride  98 - 107 mmol/L 97 Low    CO2  22 - 30 mmol/L 22   Anion Gap  8 - 15 mmol/L 13   Estimated Glomerular Filtration Rate  >=60 mL/min/1.73m² 74   Comment: Estimated Glomerular Filtration Rate (eGFR) is calculated using the 2021 CKD-EPI creatinine equation. This equation utilizes serum creatinine, sex, and age as parameters. The creatinine assay has traceable calibration to isotope dilution-mass spectrometry. Refer to KDIGO guidelines for clinical interpretation. In patients with unstable renal function, e.g. those with acute kidney injury, the eGFR may not accurately reflect actual GFR.   Resulting Agency [facility]     Specimen Collected: 08/23/24 08:12    Performed by: [facility] Last Resulted: 08/24/24 04:09   Received From: [facility]  Result Received: 09/06/24 06:32    Received Information  Result Report    COMP METABOLIC PANEL (Order #[id number]) on 8/23/24     Lab Component SmartPhrase Guide    COMP METABOLIC PANEL (Order #[id number]) on 8/23/24     [facility]  Outside Information      suggestion  Information displayed in this report may not trend or trigger automated decision support.     TSH BLD  Order: [id number]  Component  Ref Range & Units 1 mo ago   TSH  0.270 - 4.200 mIU/L 2.910   Resulting [facility]     Specimen Co   [facility]  Outside Information      suggestion  Information displayed in this report may not trend or trigger automated decision support.      Contains abnormal data LIPID PANEL BASIC  Order: [id number]  Component  Ref Range & Units 1 mo ago   Cholesterol, Total  <200 mg/dL 119   Comment: <200 mg/dL, Desirable   200-239 mg/dL, Borderline high  >239 mg/dL, High   Triglyceride  <150 mg/dL 198 High    Comment: <150 mg/dL, Normal   150-199 mg/dL, Borderline high   200-499 mg/dL,  High  >499 mg/dL, Very high   HDL Cholesterol  >39 mg/dL 35 Low    Comment:  40-59 mg/dL, Acceptable  >59 mg/dL, High: Negative risk factor for coronary heart disease  <40 mg/dL, Low: Positive risk factor for coronary heart disease   Non HDL Cholesterol  <130 mg/dL 84   Comment: <130 mg/dL, Optimal   130-159 mg/dL, Near optimal/above optimal   160-189 mg/dL, Borderline high   190-219 mg/dL, High  >219 mg/dL, Very high  Secondary prevention optimal non HDL Cholesterol levels are recommended to be <100 mg/dL   Fasting Time  hrs 12   VLDL Cholesterol  <30 mg/dL 40 High    TC:HDL Ratio  <5.10 3.40   LDL Cholesterol  <100 mg/dL 44   Comment: <100 mg/dL, Optimal   100-129 mg/dL           Physical Exam  Vital sign reviewed and normal pulse ox good  ENT eyes clear PERRL bilaterally nose with very mild rhinorrhea but otherwise no thick exudate sinuses nontender TMs retracted scarred without redness oral exam benign  Neck neck is all without mass adenopathy bruits rigidity no stridor no thyromegaly  Chest chest is clear  Cardiovascular RSR with no murmurs and no gallop today no ectopy.  Peripheral vascular no symmetric asymmetric edema distal pulses are intact no striking sensorimotor deficits.  Mood mood is stable out anxiety depressive or cognitive issues  Reviewed earlier labs per endocrinology which is stable A1c and stable LDL cholesterol and stable TSH on her Synthroid      Assessment/Plan   Problem List Items Addressed This Visit    None  Will continue follow-up with her above cardio endocrine subspecialist.  Happy that no interval sinusitis will continue antihistamines nasal steroids and supportive care.  Follow-up in 4 to 5 months aware to call if interval worsening otherwise happy good progress on Mounjaro and the above diabetic medicines and antihypertensive medicines.  Advised to continue females maintenance  Suggested both COVID as well as influenza vaccines this jorge  @discharge  The above diagnosis and  treatment plan was discussed with the patient patient will continue appropriate diet and exercise as reviewed  Patient will recheck earlier if any interval problems of significance or clinical worsening of the above problems.  Agrees above surveillance.  All question were addressed regarding above meds

## 2024-10-02 PROCEDURE — RXMED WILLOW AMBULATORY MEDICATION CHARGE

## 2024-10-05 ENCOUNTER — PHARMACY VISIT (OUTPATIENT)
Dept: PHARMACY | Facility: CLINIC | Age: 59
End: 2024-10-05
Payer: COMMERCIAL

## 2024-10-05 ASSESSMENT — ENCOUNTER SYMPTOMS
SORE THROAT: 0
LIGHT-HEADEDNESS: 0
RHINORRHEA: 1
CHEST TIGHTNESS: 0
VOMITING: 0
HEMATURIA: 0
UNEXPECTED WEIGHT CHANGE: 0
MYALGIAS: 1
PALPITATIONS: 0
SHORTNESS OF BREATH: 0
COUGH: 0
DYSURIA: 0
WEAKNESS: 0
ARTHRALGIAS: 0
NUMBNESS: 0
HEADACHES: 0
FLANK PAIN: 0
SINUS PRESSURE: 0
BLOOD IN STOOL: 0
FREQUENCY: 1
SINUS PAIN: 0
FATIGUE: 0
SLEEP DISTURBANCE: 0
FEVER: 0
ABDOMINAL PAIN: 0

## 2024-10-08 ENCOUNTER — APPOINTMENT (OUTPATIENT)
Dept: CARDIOLOGY | Facility: CLINIC | Age: 59
End: 2024-10-08
Payer: COMMERCIAL

## 2024-10-08 VITALS
HEIGHT: 67 IN | WEIGHT: 204.9 LBS | DIASTOLIC BLOOD PRESSURE: 62 MMHG | BODY MASS INDEX: 32.16 KG/M2 | SYSTOLIC BLOOD PRESSURE: 100 MMHG | HEART RATE: 72 BPM

## 2024-10-08 DIAGNOSIS — E11.9 TYPE 2 DIABETES MELLITUS WITHOUT COMPLICATION, WITHOUT LONG-TERM CURRENT USE OF INSULIN (MULTI): ICD-10-CM

## 2024-10-08 DIAGNOSIS — E78.2 MIXED HYPERLIPIDEMIA: ICD-10-CM

## 2024-10-08 DIAGNOSIS — Z98.61 CAD S/P PERCUTANEOUS CORONARY ANGIOPLASTY: ICD-10-CM

## 2024-10-08 DIAGNOSIS — E66.811 CLASS 1 OBESITY WITH BODY MASS INDEX (BMI) OF 32.0 TO 32.9 IN ADULT, UNSPECIFIED OBESITY TYPE, UNSPECIFIED WHETHER SERIOUS COMORBIDITY PRESENT: ICD-10-CM

## 2024-10-08 DIAGNOSIS — Z85.72 HISTORY OF NON-HODGKIN'S LYMPHOMA: ICD-10-CM

## 2024-10-08 DIAGNOSIS — I10 PRIMARY HYPERTENSION: ICD-10-CM

## 2024-10-08 DIAGNOSIS — Z87.891 FORMER SMOKER: ICD-10-CM

## 2024-10-08 DIAGNOSIS — I25.10 CAD S/P PERCUTANEOUS CORONARY ANGIOPLASTY: ICD-10-CM

## 2024-10-08 PROCEDURE — 1036F TOBACCO NON-USER: CPT | Performed by: INTERNAL MEDICINE

## 2024-10-08 PROCEDURE — 99213 OFFICE O/P EST LOW 20 MIN: CPT | Performed by: INTERNAL MEDICINE

## 2024-10-08 PROCEDURE — 3008F BODY MASS INDEX DOCD: CPT | Performed by: INTERNAL MEDICINE

## 2024-10-08 PROCEDURE — 3078F DIAST BP <80 MM HG: CPT | Performed by: INTERNAL MEDICINE

## 2024-10-08 PROCEDURE — 4010F ACE/ARB THERAPY RXD/TAKEN: CPT | Performed by: INTERNAL MEDICINE

## 2024-10-08 PROCEDURE — 3074F SYST BP LT 130 MM HG: CPT | Performed by: INTERNAL MEDICINE

## 2024-10-08 NOTE — PATIENT INSTRUCTIONS
Patient to follow up in 6 months with Dr. Edgardo Martin MD [facility]     No changes today.   Plan to continue Plavix at least another 6 months for now.     Continue same medications and treatments.   Patient educated on proper medication use.   Patient educated on risk factor modification.   Please bring any lab results from other providers / physicians to your next appointment.     Please bring all medicines, vitamins, and herbal supplements with you when you come to the office.     Prescriptions will not be filled unless you are compliant with your follow up appointments or have a follow up appointment scheduled as per instruction of your physician. Refills should be requested at the time of your visit.    Min JACOBOSN RN am scribing for and in the presence of Dr. Edgardo Martin MD [facility]

## 2024-10-08 NOTE — PROGRESS NOTES
Referred by Dr. Lopez ref. provider found provider found for   Chief Complaint   Patient presents with    Follow-up     6 month follow up        History of Present Illness  Israel Swartz is a 59 y.o. year old female patient doing well from a cardiac standpoint no complaint no symptoms of angina or chest pain since that she had a stent put in.  She is still on on Plavix.  Her diabetes under control.  Blood pressure is under control.  I told her that we will keep Plavix going to the total of 1 year.  She will call me back if she has any recurrent symptoms.  She will see me back as scheduled    Past Medical History  Past Medical History:   Diagnosis Date    Coronary artery disease     Diabetes mellitus (Multi)     Disease of thyroid gland     Dizziness and giddiness 11/10/2021    Lightheadedness    Encounter for preprocedural cardiovascular examination 11/16/2021    Pre-operative cardiovascular examination    Hyperlipidemia     Hypertension     Non-Hodgkin lymphoma, unspecified, lymph nodes of head, face, and neck (Multi) 11/10/2021    Malignant lymphomas of lymph nodes of head, face, and neck    Otalgia, left ear 06/02/2020    Otalgia of left ear    Other acute postprocedural pain 02/18/2022    Acute postoperative pain    Personal history of other diseases of the musculoskeletal system and connective tissue 07/16/2019    History of muscle spasm    Personal history of other diseases of the nervous system and sense organs 01/01/2022    History of sciatica    Personal history of other endocrine, nutritional and metabolic disease 05/17/2022    History of hypercalcemia    Personal history of other specified conditions     History of polyuria    Personal history of other specified conditions 11/10/2021    History of shortness of breath    Personal history of other specified conditions 11/16/2021    History of chest pain    Personal history of other specified conditions 11/10/2021    History of palpitations    Personal  history of urinary (tract) infections 11/24/2020    History of acute cystitis    Sialoadenitis, unspecified 05/05/2020    Sialadenitis    Unspecified asthma, uncomplicated ([facility]) 08/16/2022    Asthma, acute       Social History  Social History     Tobacco Use    Smoking status: Never    Smokeless tobacco: Never   Vaping Use    Vaping status: Never Used   Substance Use Topics    Alcohol use: Not Currently    Drug use: Never       Family History     Family History   Problem Relation Name Age of Onset    Liver cancer Mother      Atrial fibrillation Father      Hypertension Father      Hyperlipidemia Father      Fainting Father         Review of Systems  As per HPI, all other systems reviewed and negative.    Allergies:  Allergies   Allergen Reactions    Cefdinir Hives and Itching    Codeine Nausea Only     pt states she can take synthetic codeine like in Vicodin    Propoxyphene N-Acetaminophen Nausea Only    Sulfa (Sulfonamide Antibiotics) Nausea Only    Tetracyclines Hives and Itching        Outpatient Medications:  Current Outpatient Medications   Medication Instructions    albuterol 90 mcg/actuation inhaler 2 puffs, inhalation, Every 4 hours PRN    aspirin 81 mg EC tablet 1 tablet, oral, Daily    cetirizine (ZYRTEC) 10 mg, oral, Daily    cholecalciferol (VITAMIN D-3) 2,000 Units, oral, Daily    clopidogrel (PLAVIX) 75 mg, oral, Daily    cyanocobalamin, vitamin B-12, (Vitamin B-12) 1,000 mcg tablet extended release 1 tablet, oral, Daily    cyclobenzaprine (FLEXERIL) 10 mg, oral, 3 times daily PRN    dilTIAZem CD (CARDIZEM CD) 120 mg, oral, Daily    fenofibrate (TRICOR) 145 mg, oral, Daily    fluoride, sodium, 1.1 % gel dental    fluticasone (Flonase) 50 mcg/actuation nasal spray 1 spray, Each Nostril, Daily    glimepiride (Amaryl) 2 mg tablet 1 tablet, oral, Daily with breakfast    levothyroxine (SYNTHROID, LEVOXYL) 88 mcg, oral, Daily    lisinopril 2.5 mg, oral, Daily    lovastatin (MEVACOR) 20 mg, oral, Nightly     magnesium oxide (Mag-Ox) 400 mg (241.3 mg magnesium) tablet 1 tablet, oral, 2 times daily    metFORMIN XR (Glucophage-XR) 500 mg 24 hr tablet 2 tablets, oral, 2 times daily    metoprolol tartrate (LOPRESSOR) 100 mg, oral, 2 times daily    nitroglycerin (NITROSTAT) 0.4 mg, sublingual, Every 5 min PRN    pioglitazone (ACTOS) 30 mg, oral, Daily    spironolactone (ALDACTONE) 25 mg, oral, Daily    tirzepatide (Mounjaro) 5 mg/0.5 mL pen injector Inject 1 pen (5 mg) under the skin 1 (one) time per week.    triamcinolone (Kenalog) 0.1 % ointment 1 Application, Topical, 2 times daily         Vitals:  Vitals:    10/08/24 0830   BP: 100/62   Pulse: 72       Physical Exam:  Physical Exam  Vitals and nursing note reviewed.   Constitutional:       Appearance: Normal appearance. She is normal weight.   HENT:      Head: Normocephalic and atraumatic.   Eyes:      Extraocular Movements: Extraocular movements intact.      Pupils: Pupils are equal, round, and reactive to light.   Cardiovascular:      Rate and Rhythm: Normal rate and regular rhythm.      Pulses: Normal pulses.   Pulmonary:      Effort: Pulmonary effort is normal.      Breath sounds: Normal breath sounds.   Musculoskeletal:      Cervical back: Normal range of motion.      Right lower leg: No edema.      Left lower leg: No edema.   Skin:     General: Skin is warm and dry.   Neurological:      General: No focal deficit present.      Mental Status: She is alert and oriented to person, place, and time.             Assessment/Plan   Diagnoses and all orders for this visit:  CAD S/P percutaneous coronary angioplasty  Mixed hyperlipidemia  Primary hypertension  Class 1 obesity with body mass index (BMI) of 32.0 to 32.9 in adult, unspecified obesity type, unspecified whether serious comorbidity present  Type 2 diabetes mellitus without complication, without long-term current use of insulin (Multi)  History of non-Hodgkin's lymphoma  Former smoker          Edgardo Martin MD  [facility]  Interventional Cardiology   of [facility]     Thank you for allowing me to participate in the care of this patient. Please do not hesitate to contact me with any further questions or concerns.

## 2024-10-09 DIAGNOSIS — I20.9 ANGINA, CLASS III (CMS-HCC): ICD-10-CM

## 2024-10-09 RX ORDER — NITROGLYCERIN 0.4 MG/1
0.4 TABLET SUBLINGUAL EVERY 5 MIN PRN
Qty: 90 TABLET | Refills: 1 | Status: SHIPPED | OUTPATIENT
Start: 2024-10-09

## 2024-10-29 DIAGNOSIS — I10 HYPERTENSION, UNSPECIFIED TYPE: ICD-10-CM

## 2024-10-29 PROCEDURE — RXMED WILLOW AMBULATORY MEDICATION CHARGE

## 2024-10-29 RX ORDER — LISINOPRIL 2.5 MG/1
2.5 TABLET ORAL DAILY
Qty: 90 TABLET | Refills: 3 | Status: SHIPPED | OUTPATIENT
Start: 2024-10-29 | End: 2025-10-29

## 2024-11-01 ENCOUNTER — PHARMACY VISIT (OUTPATIENT)
Dept: PHARMACY | Facility: CLINIC | Age: 59
End: 2024-11-01
Payer: COMMERCIAL

## 2024-11-11 ENCOUNTER — HOSPITAL ENCOUNTER (OUTPATIENT)
Dept: RADIOLOGY | Facility: HOSPITAL | Age: 59
Discharge: HOME | End: 2024-11-11
Payer: COMMERCIAL

## 2024-11-11 DIAGNOSIS — Z12.31 ENCOUNTER FOR SCREENING MAMMOGRAM FOR MALIGNANT NEOPLASM OF BREAST: ICD-10-CM

## 2024-11-11 PROCEDURE — 77067 SCR MAMMO BI INCL CAD: CPT

## 2024-11-11 PROCEDURE — 77067 SCR MAMMO BI INCL CAD: CPT | Performed by: RADIOLOGY

## 2024-11-11 PROCEDURE — 77063 BREAST TOMOSYNTHESIS BI: CPT | Performed by: RADIOLOGY

## 2024-11-23 PROCEDURE — RXMED WILLOW AMBULATORY MEDICATION CHARGE

## 2024-11-26 ENCOUNTER — PHARMACY VISIT (OUTPATIENT)
Dept: PHARMACY | Facility: CLINIC | Age: 59
End: 2024-11-26
Payer: COMMERCIAL

## 2024-12-09 DIAGNOSIS — M62.838 MUSCLE SPASM: ICD-10-CM

## 2024-12-09 RX ORDER — CYCLOBENZAPRINE HCL 10 MG
TABLET ORAL
Qty: 90 TABLET | Refills: 1 | Status: SHIPPED | OUTPATIENT
Start: 2024-12-09

## 2024-12-20 ENCOUNTER — APPOINTMENT (OUTPATIENT)
Dept: PRIMARY CARE | Facility: CLINIC | Age: 59
End: 2024-12-20
Payer: COMMERCIAL

## 2024-12-20 VITALS
TEMPERATURE: 95.5 F | SYSTOLIC BLOOD PRESSURE: 112 MMHG | OXYGEN SATURATION: 97 % | WEIGHT: 201 LBS | HEART RATE: 82 BPM | HEIGHT: 67 IN | DIASTOLIC BLOOD PRESSURE: 70 MMHG | RESPIRATION RATE: 16 BRPM | BODY MASS INDEX: 31.55 KG/M2

## 2024-12-20 DIAGNOSIS — J32.0 CHRONIC MAXILLARY SINUSITIS: ICD-10-CM

## 2024-12-20 DIAGNOSIS — I25.10 CAD S/P PERCUTANEOUS CORONARY ANGIOPLASTY: ICD-10-CM

## 2024-12-20 DIAGNOSIS — H00.022 HORDEOLUM INTERNUM OF RIGHT LOWER EYELID: ICD-10-CM

## 2024-12-20 DIAGNOSIS — I10 PRIMARY HYPERTENSION: Primary | ICD-10-CM

## 2024-12-20 DIAGNOSIS — Z98.61 CAD S/P PERCUTANEOUS CORONARY ANGIOPLASTY: ICD-10-CM

## 2024-12-20 PROCEDURE — 3008F BODY MASS INDEX DOCD: CPT | Performed by: FAMILY MEDICINE

## 2024-12-20 PROCEDURE — 3074F SYST BP LT 130 MM HG: CPT | Performed by: FAMILY MEDICINE

## 2024-12-20 PROCEDURE — 99213 OFFICE O/P EST LOW 20 MIN: CPT | Performed by: FAMILY MEDICINE

## 2024-12-20 PROCEDURE — 3078F DIAST BP <80 MM HG: CPT | Performed by: FAMILY MEDICINE

## 2024-12-20 PROCEDURE — 4010F ACE/ARB THERAPY RXD/TAKEN: CPT | Performed by: FAMILY MEDICINE

## 2024-12-20 PROCEDURE — 1036F TOBACCO NON-USER: CPT | Performed by: FAMILY MEDICINE

## 2024-12-20 RX ORDER — GENTAMICIN SULFATE 0.3 %
0.5 OINTMENT (GRAM) OPHTHALMIC (EYE) 3 TIMES DAILY
Qty: 3.5 G | Refills: 0 | Status: SHIPPED | OUTPATIENT
Start: 2024-12-20 | End: 2024-12-27

## 2024-12-20 RX ORDER — AZITHROMYCIN 250 MG/1
TABLET, FILM COATED ORAL
Qty: 6 TABLET | Refills: 0 | Status: SHIPPED | OUTPATIENT
Start: 2024-12-20

## 2024-12-20 NOTE — PROGRESS NOTES
Subjective   Patient ID: Israel Swartz is a 59 y.o. female who presents for Hypertension (2 month follow up ) and Sinusitis.  HPI  Pleasant 59-year-old with a history of type 2 diabetes followed by endocrinology.  She is on Actos Amaryl metformin as well as Mounjaro and doing much better  Does followed by cardiology for coronary insufficiency and does take her Tricor and Mevacor and both diltiazem and metoprolol status post coronary stents and otherwise is really had no significant tachycardia exertional chest pain or dyspnea recently.  Most recently seen optometry repeatedly for right eye styes both internal and external has not been on any type of antibiotic ointment but has been on repeated cleansing with baby shampoo as well as warm compresses but still has right lower eyelid swelling and discomfort.  Otherwise no change in visual acuity no change in peripheral vision  Remittent congestion in the right maxillary sinus pressure.  Wise no cough chest pain palpitations or cardio pulmonary problems recently  Reviewed chronic medicines no reported side effects.  Review of Systems   Constitutional:  Positive for fatigue. Negative for fever and unexpected weight change.   HENT:  Positive for rhinorrhea and sinus pressure. Negative for ear discharge, ear pain and sore throat.    Eyes:  Positive for redness and itching. Negative for discharge and visual disturbance.   Respiratory:  Negative for cough and shortness of breath.    Cardiovascular:  Negative for chest pain, palpitations and leg swelling.   Genitourinary:  Positive for frequency. Negative for dysuria, flank pain and hematuria.   Musculoskeletal:  Positive for arthralgias.   Skin:  Negative for rash.   Neurological:  Negative for syncope, weakness, light-headedness and headaches.   Psychiatric/Behavioral:  Negative for confusion, sleep disturbance and suicidal ideas.        Objective   /70 (BP Location: Left arm, Patient Position: Sitting, BP Cuff  "Size: Large adult)   Pulse 82   Temp 35.3 °C (95.5 °F) (Temporal)   Resp 16   Ht 1.689 m (5' 6.5\")   Wt 91.2 kg (201 lb)   LMP  (LMP Unknown)   SpO2 97%   BMI 31.96 kg/m²           Physical Exam  Vital signs reviewed and normal  ENT the sclera bilaterally is clear both corneas are clear extra muscles are intact.  No parable edema except the inside of the right mid lower eyelid shows red swelling but otherwise upper eyelid is unremarkable visual acuity monocular binocular is normal no eye discharge no significant injection no either eye however the right nostril with a lot of yellow rhinorrhea tenderness of right maxillary sinus both TMs scarred retracted.  Oral exam is benign  Neck neck is all without mass adenopathy bruits rigidity  Chest chest is clear  Cardiovascular RSR without significant murmurs and no ectopy today which is good  Peripheral vascular no symmetric asymmetric edema very minimally reduced vibratory sense in the feet otherwise no vascular or motor deficits.  Skin no rash petechiae or jaundice      Assessment/Plan   Problem List Items Addressed This Visit    None  Continue nasal steroids as well as antihistamines at night and will add Z-Filiberto for the buffed sinusitis  With continued and recurrent internal styes of both of the right upper and right lower eyelid, will place on your Garamycin ophthalmic ointment at least 2-3 times a day continue warm moist compresses continue cleansing the outside of the eyelids with baby shampoo  Above cardiac medicines continue above diabetic medicine follow-up with her [facility] cardiologist and also [facility] endocrinologist follow-up in 3 to 4 months and a worsening let me know recontact her, dressed  @discharge  The above diagnosis and treatment plan was discussed with the patient patient will continue appropriate diet and exercise as reviewed  Patient will recheck earlier if any interval problems of significance or clinical worsening of the " above problems.  Agrees above surveillance.  All question were addressed regarding above meds

## 2024-12-21 PROCEDURE — RXMED WILLOW AMBULATORY MEDICATION CHARGE

## 2024-12-23 ENCOUNTER — PHARMACY VISIT (OUTPATIENT)
Dept: PHARMACY | Facility: CLINIC | Age: 59
End: 2024-12-23
Payer: COMMERCIAL

## 2024-12-25 PROBLEM — H00.022 HORDEOLUM INTERNUM OF RIGHT LOWER EYELID: Status: ACTIVE | Noted: 2024-12-25

## 2024-12-25 ASSESSMENT — ENCOUNTER SYMPTOMS
EYE ITCHING: 1
HEADACHES: 0
ARTHRALGIAS: 1
WEAKNESS: 0
RHINORRHEA: 1
FLANK PAIN: 0
SORE THROAT: 0
FATIGUE: 1
EYE DISCHARGE: 0
SHORTNESS OF BREATH: 0
CONFUSION: 0
FREQUENCY: 1
COUGH: 0
UNEXPECTED WEIGHT CHANGE: 0
DYSURIA: 0
LIGHT-HEADEDNESS: 0
HEMATURIA: 0
EYE REDNESS: 1
SINUS PRESSURE: 1
SLEEP DISTURBANCE: 0
FEVER: 0
PALPITATIONS: 0

## 2024-12-27 DIAGNOSIS — H00.022 HORDEOLUM INTERNUM OF RIGHT LOWER EYELID: ICD-10-CM

## 2024-12-30 RX ORDER — GENTAMICIN SULFATE 0.3 %
0.5 OINTMENT (GRAM) OPHTHALMIC (EYE) 3 TIMES DAILY
Qty: 3.5 G | Refills: 0 | Status: SHIPPED | OUTPATIENT
Start: 2024-12-30 | End: 2025-01-06

## 2024-12-30 RX ORDER — GENTAMICIN SULFATE 3 MG/ML
1-2 SOLUTION/ DROPS OPHTHALMIC EVERY 4 HOURS
Qty: 4.2 ML | Refills: 0 | Status: SHIPPED | OUTPATIENT
Start: 2024-12-30 | End: 2025-01-06

## 2024-12-30 NOTE — TELEPHONE ENCOUNTER
Pt states pharmacy does not carry the ointment, but they do carry the drops. Please dc rx for the ointment. Rx pended for drops. Please accept or decline.

## 2025-01-17 PROCEDURE — RXMED WILLOW AMBULATORY MEDICATION CHARGE

## 2025-01-20 ENCOUNTER — PHARMACY VISIT (OUTPATIENT)
Dept: PHARMACY | Facility: CLINIC | Age: 60
End: 2025-01-20
Payer: COMMERCIAL

## 2025-02-04 ENCOUNTER — PATIENT MESSAGE (OUTPATIENT)
Dept: PRIMARY CARE | Facility: CLINIC | Age: 60
End: 2025-02-04
Payer: COMMERCIAL

## 2025-02-04 DIAGNOSIS — J45.20 MILD INTERMITTENT ASTHMA WITHOUT COMPLICATION (HHS-HCC): ICD-10-CM

## 2025-02-04 RX ORDER — ALBUTEROL SULFATE 90 UG/1
2 INHALANT RESPIRATORY (INHALATION) EVERY 4 HOURS PRN
Qty: 18 G | Refills: 3 | Status: SHIPPED | OUTPATIENT
Start: 2025-02-04

## 2025-02-04 NOTE — PATIENT COMMUNICATION
Rx Refill Request     Name: Israel PHILIPP Maxime  :  1965   Medication Name:  albuterol 90 mcg/actuation inhaler   Specific Pharmacy location:  [facility] [address] [place], [place]   Date of last appointment:  2024   Date of next appointment:  3/19/2025   Best number to reach patient:  [phone]

## 2025-02-14 PROCEDURE — RXMED WILLOW AMBULATORY MEDICATION CHARGE

## 2025-02-17 ENCOUNTER — PHARMACY VISIT (OUTPATIENT)
Dept: PHARMACY | Facility: CLINIC | Age: 60
End: 2025-02-17
Payer: COMMERCIAL

## 2025-03-19 ENCOUNTER — APPOINTMENT (OUTPATIENT)
Dept: PRIMARY CARE | Facility: CLINIC | Age: 60
End: 2025-03-19
Payer: COMMERCIAL

## 2025-03-19 VITALS
HEIGHT: 67 IN | DIASTOLIC BLOOD PRESSURE: 66 MMHG | RESPIRATION RATE: 16 BRPM | BODY MASS INDEX: 31.39 KG/M2 | WEIGHT: 200 LBS | OXYGEN SATURATION: 97 % | SYSTOLIC BLOOD PRESSURE: 102 MMHG | TEMPERATURE: 95.5 F | HEART RATE: 74 BPM

## 2025-03-19 DIAGNOSIS — I20.9 ANGINA, CLASS III (CMS-HCC): ICD-10-CM

## 2025-03-19 DIAGNOSIS — I25.10 CAD S/P PERCUTANEOUS CORONARY ANGIOPLASTY: Primary | ICD-10-CM

## 2025-03-19 DIAGNOSIS — J32.0 CHRONIC MAXILLARY SINUSITIS: ICD-10-CM

## 2025-03-19 DIAGNOSIS — I10 PRIMARY HYPERTENSION: ICD-10-CM

## 2025-03-19 DIAGNOSIS — Z98.61 CAD S/P PERCUTANEOUS CORONARY ANGIOPLASTY: Primary | ICD-10-CM

## 2025-03-19 DIAGNOSIS — E03.9 ACQUIRED HYPOTHYROIDISM: ICD-10-CM

## 2025-03-19 PROCEDURE — 1036F TOBACCO NON-USER: CPT | Performed by: FAMILY MEDICINE

## 2025-03-19 PROCEDURE — 3008F BODY MASS INDEX DOCD: CPT | Performed by: FAMILY MEDICINE

## 2025-03-19 PROCEDURE — 99213 OFFICE O/P EST LOW 20 MIN: CPT | Performed by: FAMILY MEDICINE

## 2025-03-19 PROCEDURE — 4010F ACE/ARB THERAPY RXD/TAKEN: CPT | Performed by: FAMILY MEDICINE

## 2025-03-19 PROCEDURE — 3074F SYST BP LT 130 MM HG: CPT | Performed by: FAMILY MEDICINE

## 2025-03-19 PROCEDURE — 3078F DIAST BP <80 MM HG: CPT | Performed by: FAMILY MEDICINE

## 2025-03-19 NOTE — PROGRESS NOTES
Subjective   Patient ID: Israel Swartz is a 60 y.o. female who presents for Hypertension (3 month follow up ).  HPI  Pleasant 70-year-old female with a history of type 2 diabetes core insufficiency hypertension is here to recheck blood pressure otherwise sinusitis been fairly stable did review her endocrinologist therapies for her diabetes.  At this time claims her sugars been improved A1c only gone out from 8 down to 7.2  For blood pressure does take 120 of diltiazem daily and under milligrams of metoprolol twice a day.  Remains on her Plavix because of coronary stenting as well as Mevacor.  Aldactone for hypertension  142 but normal kidney functions.  Normal TSH on Synthroid.  Otherwise no active chest pain short of breath or palpitation watches her low-salt low carbohydrate and low-fat diet well  New GI/ issues.  Follow-up with [facility] cardiology and endocrinology on a regular basis.  Chronic meds reviewed no reported side effects  Review of Systems   Constitutional:  Negative for fatigue, fever and unexpected weight change.   HENT:  Positive for rhinorrhea. Negative for ear pain, sinus pressure, sinus pain and voice change.    Respiratory:  Negative for cough and shortness of breath.    Cardiovascular:  Negative for chest pain, palpitations and leg swelling.   Gastrointestinal:  Negative for abdominal pain, blood in stool and vomiting.   Genitourinary:  Positive for frequency. Negative for dysuria, flank pain and hematuria.   Musculoskeletal:  Positive for arthralgias and back pain.   Skin:  Negative for rash.   Neurological:  Negative for syncope, weakness, light-headedness and headaches.   Hematological:  Negative for adenopathy.   Psychiatric/Behavioral:  Negative for confusion, sleep disturbance and suicidal ideas.        Objective   /66 (BP Location: Left arm, Patient Position: Sitting, BP Cuff Size: Large adult)   Pulse 74   Temp 35.3 °C (95.5 °F) (Temporal)   Resp 16   Ht 1.689 m  "(5' 6.5\")   Wt 90.7 kg (200 lb)   LMP  (LMP Unknown)   SpO2 97%   BMI 31.80 kg/m²   ntains abnormal data HEMOGLOBIN A1C  Order: [id number]   Status: Final result              Component  Ref Range & Units 7 mo ago  (8/23/24) 1 yr ago  (10/12/23) 2 yr ago  (12/27/22) 2 yr ago  (7/15/22) 3 yr ago  (12/27/21) 3 yr ago  (7/14/21) 4 yr ago  (1/21/21)   Hemoglobin A1C  4.3 - 5.6 % 7.2 High  8.0 High  CM 7.4 High  CM 8.8 High  CM 8.3 High  CM 7.4 High  CM 8.1 High  CM   Comment: American Diabetes Association guidelines indicate that patients with HgbA1c in the range 5.7-6.4% are at increased risk for development of diabetes, and intervention by lifestyle modification may be beneficial. HgbA1c greater or equal to 6.5% is considered diagnostic of diabetes.   Estimated Average Glucose  mg/dL 160 183      CM   Comment: eAG: (Estimated average glucose) is a calculated value from HgbA1c and is repre           suggestion  Information displayed in this report may not trend or trigger automated decision support.      Contains abnormal data COMP METABOLIC PANEL  Order: [id number]  Component  Ref Range & Units 7 mo ago   Protein, Total  6.3 - 8.0 g/dL 7.3   Albumin  3.9 - 4.9 g/dL 4.6   Calcium, Total  8.5 - 10.2 mg/dL 9.9   Bilirubin, Total  0.2 - 1.3 mg/dL 0.5   Alkaline Phosphatase  34 - 123 U/L 58   AST  13 - 35 U/L 34   ALT  7 - 38 U/L 31   Glucose  74 - 99 mg/dL 146 High    Comment: The American Diabetes Association (ADA) provides guidance for cutoff values for fasting glucose and random glucose. The ADA defines fasting as no caloric intake for at least 8 hours. Fasting plasma glucose results between 100 to 125 mg/dL indicate increased risk for diabetes (prediabetes).  Fasting plasma glucose results greater than or equal to 126 mg/dL meet the criteria for diagnosis of diabetes. In the absence of unequivocal hyperglycemia, results should be confirmed by repeat testing. In a patient with classic " symptoms of hyperglycemia or hyperglycemic crisis, random plasma glucose results greater than or equal to 200 mg/dL meet the criteria for diagnosis of diabetes.  Reference: Standards of Medical Care in Diabetes 2016, American Diabetes Association. Diabetes Care. 2016.39(Suppl 1).   BUN  7 - 21 mg/dL 20   Creatinine  0.58 - 0.96 mg/dL 0.89   Sodium  136 - 144 mmol/L 132 Low    Potassium  3.7 - 5.1 mmol/L 4.5   Chloride  98 - 107 mmol/L 97 Low    CO2  22 - 30 mmol/L 22   Anion Gap  8 - 15 mmol/L 13   Estimated Glomerular Filtration Rate  >=60 mL/min/1.73m² 74      Information displayed in this report may not trend or trigger automated decision support.     TSH BLD  Order: [id number]  Component  Ref Range & Units 7 mo ago   TSH  0.270 - 4.200 mIU/L 2.910   Resulting Agency [facility]     Specimen Collected: 08/23/24 08:12       suggestion  Information displayed in this report may not trend or trigger automated decision support.      Contains abnormal data LIPID PANEL BASIC  Order: [id number]  Component  Ref Range & Units 7 mo ago   Cholesterol, Total  <200 mg/dL 119   Comment: <200 mg/dL, Desirable   200-239 mg/dL, Borderline high  >239 mg/dL, High   Triglyceride  <150 mg/dL 198 High    Comment: <150 mg/dL, Normal   150-199 mg/dL, Borderline high   200-499 mg/dL, High  >499 mg/dL, Very high   HDL Cholesterol  >39 mg/dL 35 Low    Comment:  40-59 mg/dL, Acceptable  >59 mg/dL, High: Negative risk factor for coronary heart disease  <40 mg/dL, Low: Positive risk factor for coronary heart disease   Non HDL Cholesterol  <130 mg/dL 84   Comment: <130 mg/dL, Optimal   130-159 mg/dL, Near optimal/above optimal   160-189 mg/dL, Borderline high   190-219 mg/dL, High  >219 mg/dL, Very high  Secondary prevention optimal non HDL Cholesterol levels are recommended to be <100 mg/dL   Fasting Time  hrs 12   VLDL Cholesterol  <30 mg/dL 40 High    TC:HDL Ratio  <5.10 3.40   LDL Cholesterol  <100 mg/dL 44   Comment:  <100 mg/dL         Physical Exam  Vital sign reviewed and normal reviewed blood pressure 110 112/76  General pleasant interactive individual in no acute distress  ENT mild water rhinorrhea but otherwise no thick exudate PERRLA bilaterally TMs scarred retracted without redness oral exam is benign  Neck neck is supple without masses adenopathy.  Thyroid is normal  Chest chest is clear anteriorly and posteriorly  Cardiovascular RSR without any ectopy today no systolic murmurs.  No gallop  GI no organomegaly mass or rebound of the mid upper abdomen no CVA tenderness  Peripheral vascular no symmetric asymmetric edema no sensorimotor vascular deficits.      Assessment/Plan   Problem List Items Addressed This Visit    None  Pressure stable and diabetes fairly stable improved on Mounjaro at this time will follow-up with both her cardiologist and endocrinologist this spring.  Did review the above lab will continue all the above endocrine and cardiac medicines.  Continue low-salt low carbohydrate low-fat diet follow-up in 4 months otherwise continue SBE  Otherwise stable cardiopulmonary wise  @discharge  The above diagnosis and treatment plan was discussed with the patient patient will continue appropriate diet and exercise as reviewed  Patient will recheck earlier if any interval problems of significance or clinical worsening of the above problems.  Agrees above surveillance.  All question were addressed regarding above meds

## 2025-03-21 ENCOUNTER — PHARMACY VISIT (OUTPATIENT)
Dept: PHARMACY | Facility: CLINIC | Age: 60
End: 2025-03-21
Payer: COMMERCIAL

## 2025-03-21 PROCEDURE — RXMED WILLOW AMBULATORY MEDICATION CHARGE

## 2025-03-23 PROBLEM — H00.022 HORDEOLUM INTERNUM OF RIGHT LOWER EYELID: Status: RESOLVED | Noted: 2024-12-25 | Resolved: 2025-03-23

## 2025-03-23 ASSESSMENT — ENCOUNTER SYMPTOMS
WEAKNESS: 0
SLEEP DISTURBANCE: 0
UNEXPECTED WEIGHT CHANGE: 0
RHINORRHEA: 1
FATIGUE: 0
SINUS PAIN: 0
FEVER: 0
CONFUSION: 0
ADENOPATHY: 0
COUGH: 0
FLANK PAIN: 0
VOMITING: 0
ABDOMINAL PAIN: 0
SHORTNESS OF BREATH: 0
ARTHRALGIAS: 1
BACK PAIN: 1
SINUS PRESSURE: 0
DYSURIA: 0
HEMATURIA: 0
VOICE CHANGE: 0
FREQUENCY: 1
LIGHT-HEADEDNESS: 0
BLOOD IN STOOL: 0
HEADACHES: 0
PALPITATIONS: 0

## 2025-04-10 ENCOUNTER — APPOINTMENT (OUTPATIENT)
Dept: CARDIOLOGY | Facility: CLINIC | Age: 60
End: 2025-04-10
Payer: COMMERCIAL

## 2025-04-14 PROCEDURE — RXMED WILLOW AMBULATORY MEDICATION CHARGE

## 2025-04-15 ENCOUNTER — PHARMACY VISIT (OUTPATIENT)
Dept: PHARMACY | Facility: CLINIC | Age: 60
End: 2025-04-15
Payer: COMMERCIAL

## 2025-04-15 ENCOUNTER — APPOINTMENT (OUTPATIENT)
Dept: CARDIOLOGY | Facility: CLINIC | Age: 60
End: 2025-04-15
Payer: COMMERCIAL

## 2025-04-15 VITALS
HEART RATE: 64 BPM | BODY MASS INDEX: 32.27 KG/M2 | HEIGHT: 67 IN | SYSTOLIC BLOOD PRESSURE: 102 MMHG | WEIGHT: 205.6 LBS | DIASTOLIC BLOOD PRESSURE: 62 MMHG

## 2025-04-15 DIAGNOSIS — Z87.891 FORMER SMOKER: ICD-10-CM

## 2025-04-15 DIAGNOSIS — Z98.61 CAD S/P PERCUTANEOUS CORONARY ANGIOPLASTY: Primary | ICD-10-CM

## 2025-04-15 DIAGNOSIS — I25.10 CAD S/P PERCUTANEOUS CORONARY ANGIOPLASTY: Primary | ICD-10-CM

## 2025-04-15 DIAGNOSIS — I10 PRIMARY HYPERTENSION: ICD-10-CM

## 2025-04-15 DIAGNOSIS — E78.2 MIXED HYPERLIPIDEMIA: ICD-10-CM

## 2025-04-15 DIAGNOSIS — E66.811 CLASS 1 OBESITY WITH BODY MASS INDEX (BMI) OF 32.0 TO 32.9 IN ADULT, UNSPECIFIED OBESITY TYPE, UNSPECIFIED WHETHER SERIOUS COMORBIDITY PRESENT: ICD-10-CM

## 2025-04-15 DIAGNOSIS — E11.9 TYPE 2 DIABETES MELLITUS WITHOUT COMPLICATION, WITHOUT LONG-TERM CURRENT USE OF INSULIN: ICD-10-CM

## 2025-04-15 PROCEDURE — 99213 OFFICE O/P EST LOW 20 MIN: CPT | Performed by: INTERNAL MEDICINE

## 2025-04-15 PROCEDURE — 3008F BODY MASS INDEX DOCD: CPT | Performed by: INTERNAL MEDICINE

## 2025-04-15 PROCEDURE — 4010F ACE/ARB THERAPY RXD/TAKEN: CPT | Performed by: INTERNAL MEDICINE

## 2025-04-15 PROCEDURE — 3078F DIAST BP <80 MM HG: CPT | Performed by: INTERNAL MEDICINE

## 2025-04-15 PROCEDURE — 1036F TOBACCO NON-USER: CPT | Performed by: INTERNAL MEDICINE

## 2025-04-15 PROCEDURE — 3074F SYST BP LT 130 MM HG: CPT | Performed by: INTERNAL MEDICINE

## 2025-04-15 NOTE — PROGRESS NOTES
Referred by Dr. Lopez ref. provider found provider found for   Chief Complaint   Patient presents with    Follow-up     6 month follow up for HTN and HLD management.        Chief complaint:   Chief Complaint   Patient presents with    Follow-up     6 month follow up for HTN and HLD management.        History of Present Illness  Israel Swartz is a 60 y.o. year old female patient status post coronary artery disease status post coronary angiography with stent plantation.  Doing well from cardiac standpoint no complaint no symptoms of chest pain or shortness of breath she has been active cholesterol is adequately controlled and diabetes control.  Discussed with the patient we will continue medication will call for any problem and follow-up as scheduled    Past Medical History  Past Medical History:   Diagnosis Date    Coronary artery disease     Diabetes mellitus (Multi)     Disease of thyroid gland     Dizziness and giddiness 11/10/2021    Lightheadedness    Encounter for preprocedural cardiovascular examination 11/16/2021    Pre-operative cardiovascular examination    Hyperlipidemia     Hypertension     Non-Hodgkin lymphoma, unspecified, lymph nodes of head, face, and neck (Multi) 11/10/2021    Malignant lymphomas of lymph nodes of head, face, and neck    Otalgia, left ear 06/02/2020    Otalgia of left ear    Other acute postprocedural pain 02/18/2022    Acute postoperative pain    Personal history of other diseases of the musculoskeletal system and connective tissue 07/16/2019    History of muscle spasm    Personal history of other diseases of the nervous system and sense organs 01/01/2022    History of sciatica    Personal history of other endocrine, nutritional and metabolic disease 05/17/2022    History of hypercalcemia    Personal history of other specified conditions     History of polyuria    Personal history of other specified conditions 11/10/2021    History of shortness of breath    Personal history of other  specified conditions 11/16/2021    History of chest pain    Personal history of other specified conditions 11/10/2021    History of palpitations    Personal history of urinary (tract) infections 11/24/2020    History of acute cystitis    Sialoadenitis, unspecified 05/05/2020    Sialadenitis    Unspecified asthma, uncomplicated ([facility]) 08/16/2022    Asthma, acute       Social History  Social History     Tobacco Use    Smoking status: Never    Smokeless tobacco: Never   Vaping Use    Vaping status: Never Used   Substance Use Topics    Alcohol use: Not Currently    Drug use: Never       Family History     Family History   Problem Relation Name Age of Onset    Liver cancer Mother      Atrial fibrillation Father      Hypertension Father      Hyperlipidemia Father      Fainting Father         Review of Systems  As per HPI, all other systems reviewed and negative.    Allergies:  Allergies   Allergen Reactions    Cefdinir Hives and Itching    Codeine Nausea Only     pt states she can take synthetic codeine like in Vicodin    Propoxyphene N-Acetaminophen Nausea Only    Sulfa (Sulfonamide Antibiotics) Nausea Only    Tetracyclines Hives and Itching        Outpatient Medications:  Current Outpatient Medications   Medication Instructions    albuterol 90 mcg/actuation inhaler 2 puffs, inhalation, Every 4 hours PRN    aspirin 81 mg EC tablet 1 tablet, Daily    cetirizine (ZYRTEC) 10 mg, Daily    cholecalciferol (VITAMIN D-3) 2,000 Units, Daily    clopidogrel (PLAVIX) 75 mg, oral, Daily    cyanocobalamin, vitamin B-12, (Vitamin B-12) 1,000 mcg tablet extended release 1 tablet, Daily    cyclobenzaprine (Flexeril) 10 mg tablet TAKE 1 TABLET BY MOUTH THREE TIMES DAILY if needed for muscle SPASM    dilTIAZem CD (CARDIZEM CD) 120 mg, oral, Daily    fenofibrate (TRICOR) 145 mg, oral, Daily    fluoride, sodium, 1.1 % gel Apply to teeth.    fluticasone (Flonase) 50 mcg/actuation nasal spray 1 spray, Daily    glimepiride (Amaryl) 2 mg  tablet 1 tablet, Daily with breakfast    levothyroxine (SYNTHROID, LEVOXYL) 88 mcg, Daily    lisinopril 2.5 mg, oral, Daily    lovastatin (MEVACOR) 20 mg, oral, Nightly    magnesium oxide (Mag-Ox) 400 mg (241.3 mg magnesium) tablet 1 tablet, 2 times daily    metFORMIN XR (Glucophage-XR) 500 mg 24 hr tablet 2 tablets, 2 times daily    metoprolol tartrate (LOPRESSOR) 100 mg, oral, 2 times daily    nitroglycerin (NITROSTAT) 0.4 mg, sublingual, Every 5 min PRN    pioglitazone (ACTOS) 30 mg, Daily    spironolactone (ALDACTONE) 25 mg, Daily    tirzepatide (Mounjaro) 5 mg/0.5 mL pen injector Inject 1 pen (5 mg) under the skin 1 (one) time per week.    tirzepatide (Mounjaro) 5 mg/0.5 mL pen injector Inject 1 pen (5 mg) under the skin 1 (one) time per week.    triamcinolone (Kenalog) 0.1 % ointment 1 Application, Topical, 2 times daily         Vitals:  Vitals:    04/15/25 0908   BP: 102/62   Pulse: 64       Physical Exam:  Physical Exam  Vitals and nursing note reviewed.   Constitutional:       Appearance: Normal appearance.   HENT:      Head: Normocephalic.   Eyes:      Pupils: Pupils are equal, round, and reactive to light.   Cardiovascular:      Rate and Rhythm: Normal rate and regular rhythm.      Pulses: Normal pulses.      Heart sounds: Normal heart sounds.   Pulmonary:      Effort: Pulmonary effort is normal.      Breath sounds: Normal breath sounds.   Musculoskeletal:         General: Normal range of motion.      Cervical back: Normal range of motion.   Skin:     General: Skin is dry.   Neurological:      General: No focal deficit present.      Mental Status: She is alert and oriented to person, place, and time.   Psychiatric:         Behavior: Behavior is cooperative.             Assessment/Plan   Diagnoses and all orders for this visit:  CAD S/P percutaneous coronary angioplasty  Mixed hyperlipidemia  Primary hypertension  Type 2 diabetes mellitus without complication, without long-term current use of  insulin  Class 1 obesity with body mass index (BMI) of 32.0 to 32.9 in adult, unspecified obesity type, unspecified whether serious comorbidity present  Former smoker      IAniya LPN am scribing for, and in the presence of Edgardo Martin M.D..    Edgardo JACOBSON M.D., personally performed the services described in the documentation as scribed by Aniya Metcalf LPN in my presence, and confirm it is both accurate and complete.      Edgardo Martin MD [facility]  Interventional Cardiology   of [facility]     Thank you for allowing me to participate in the care of this patient. Please do not hesitate to contact me with any further questions or concerns.

## 2025-05-09 PROCEDURE — RXMED WILLOW AMBULATORY MEDICATION CHARGE

## 2025-05-13 ENCOUNTER — PHARMACY VISIT (OUTPATIENT)
Dept: PHARMACY | Facility: CLINIC | Age: 60
End: 2025-05-13
Payer: COMMERCIAL

## 2025-06-09 PROCEDURE — RXMED WILLOW AMBULATORY MEDICATION CHARGE

## 2025-06-10 ENCOUNTER — PHARMACY VISIT (OUTPATIENT)
Dept: PHARMACY | Facility: CLINIC | Age: 60
End: 2025-06-10
Payer: COMMERCIAL

## 2025-06-16 DIAGNOSIS — I10 BENIGN ESSENTIAL HYPERTENSION: ICD-10-CM

## 2025-06-16 RX ORDER — METOPROLOL TARTRATE 100 MG/1
100 TABLET ORAL 2 TIMES DAILY
Qty: 180 TABLET | Refills: 3 | Status: SHIPPED | OUTPATIENT
Start: 2025-06-16 | End: 2026-06-16

## 2025-06-16 NOTE — TELEPHONE ENCOUNTER
Received request for prescription refill for patient.  Patient follows with Dr. Edgardo Martin MD, [facility]     Request is for metoprolol   Is patient currently on medication- yes    Last OV- 4/15/25  Next OV- 1/13/26    Pended for signing and sent to provider.

## 2025-07-01 ENCOUNTER — PHARMACY VISIT (OUTPATIENT)
Dept: PHARMACY | Facility: CLINIC | Age: 60
End: 2025-07-01
Payer: COMMERCIAL

## 2025-07-01 PROCEDURE — RXMED WILLOW AMBULATORY MEDICATION CHARGE

## 2025-07-15 ENCOUNTER — APPOINTMENT (OUTPATIENT)
Dept: PRIMARY CARE | Facility: CLINIC | Age: 60
End: 2025-07-15
Payer: COMMERCIAL

## 2025-07-18 ENCOUNTER — APPOINTMENT (OUTPATIENT)
Dept: PRIMARY CARE | Facility: CLINIC | Age: 60
End: 2025-07-18
Payer: COMMERCIAL

## 2025-07-18 VITALS
RESPIRATION RATE: 16 BRPM | HEART RATE: 76 BPM | TEMPERATURE: 95.9 F | HEIGHT: 67 IN | SYSTOLIC BLOOD PRESSURE: 116 MMHG | OXYGEN SATURATION: 99 % | DIASTOLIC BLOOD PRESSURE: 74 MMHG | WEIGHT: 200 LBS | BODY MASS INDEX: 31.39 KG/M2

## 2025-07-18 DIAGNOSIS — J01.01 ACUTE RECURRENT MAXILLARY SINUSITIS: Primary | ICD-10-CM

## 2025-07-18 PROCEDURE — 1036F TOBACCO NON-USER: CPT | Performed by: FAMILY MEDICINE

## 2025-07-18 PROCEDURE — 3074F SYST BP LT 130 MM HG: CPT | Performed by: FAMILY MEDICINE

## 2025-07-18 PROCEDURE — 3078F DIAST BP <80 MM HG: CPT | Performed by: FAMILY MEDICINE

## 2025-07-18 PROCEDURE — 3008F BODY MASS INDEX DOCD: CPT | Performed by: FAMILY MEDICINE

## 2025-07-18 PROCEDURE — 99213 OFFICE O/P EST LOW 20 MIN: CPT | Performed by: FAMILY MEDICINE

## 2025-07-18 PROCEDURE — 4010F ACE/ARB THERAPY RXD/TAKEN: CPT | Performed by: FAMILY MEDICINE

## 2025-07-18 RX ORDER — AZITHROMYCIN 250 MG/1
TABLET, FILM COATED ORAL
Qty: 6 TABLET | Refills: 0 | Status: SHIPPED | OUTPATIENT
Start: 2025-07-18

## 2025-07-18 NOTE — PROGRESS NOTES
Subjective   Patient ID: Israel Swartz is a 60 y.o. female who presents for Sinusitis (4 month follow up ).  HPI  Pleasant 60 year-old female with a history of chronic insufficiency atrial tachycardia type 2 diabetes and followed by both cardiology and endocrinology.  She remains on her diltiazem and metoprolol and lisinopril and aspirin and Plavix.  No resting positional chest pain or palpitations  Does take her metformin Amaryl Actos and Mounjaro and sugars range from .  Major complaint is increasing retro-orbital and specially cheek pain with a lot of thick yellow rhinorrhea over the last 3 to 5 days now bilateral ear pressure low-grade fever but no rash no striking headache cough shortness of breath GI symptoms or rash.  Concerned because despite normal saline and nasal steroids her nasal congestion and pressure continues  Chronic meds reviewed no reported side effects.  Asthma intermittent has been stable supplemental vitamin D supplemental B12 and Synthroid 88.  Does take for lipids Tricor and Mevacor.  Review of Systems   Constitutional:  Negative for fatigue, fever and unexpected weight change.   HENT:  Positive for ear pain, rhinorrhea, sinus pressure and sinus pain. Negative for hearing loss, tinnitus, trouble swallowing and voice change.    Eyes:  Negative for visual disturbance.   Respiratory:  Negative for cough, chest tightness and shortness of breath.    Cardiovascular:  Negative for chest pain, palpitations and leg swelling.   Gastrointestinal:  Negative for abdominal pain, blood in stool, nausea, rectal pain and vomiting.   Genitourinary:  Positive for frequency. Negative for dysuria, flank pain and hematuria.   Musculoskeletal:  Positive for arthralgias and back pain. Negative for myalgias.   Skin:  Negative for rash.   Neurological:  Negative for weakness, light-headedness, numbness and headaches.   Psychiatric/Behavioral:  Negative for behavioral problems, confusion, sleep disturbance  "and suicidal ideas.        Objective   /74 (BP Location: Right arm, Patient Position: Sitting, BP Cuff Size: Large adult)   Pulse 76   Temp 35.5 °C (95.9 °F) (Temporal)   Resp 16   Ht 1.689 m (5' 6.5\")   Wt 90.7 kg (200 lb)   LMP  (LMP Unknown)   SpO2 99%   BMI 31.80 kg/m²           Physical Exam  Vital sign reviewed and normal pulse ox good pulse regular and blood pressure great  EENT eyes clear PERRL bilaterally nose very engorged turbinates right greater than left with thick yellow rhinorrhea.  Most the tenderness is really in her left maxillary sinus area of the left TM shows superior hyperemia with air-fluid level right TM is retracted only oral exam is benign  Neck neck is supple masses adenopathy bruits or rigidity thyroid is normal  Chest chest clear anteriorly and posteriorly  Cardiovascular very soft grade 1/2 over 6 systolic murmur heard at the apex otherwise no diastolic murmurs gallop or ectopy vascular no symmetric or asymmetric edema pulses are intact  Skin no new rashes petechiae or jaundice  neuro-No new focal deficit upper lower extremities speech gait cognition is normal    Assessment/Plan   Problem List Items Addressed This Visit    None  Patient will continue her good low-salt low carbohydrate low-fat diet  Continue above diabetic medicines and above cardiac medicines.  Will continue alternating nasal steroids and nasal saline and a Z-Filiberto for the worsening sinusitis over the last 3 to 5 days use side effects reviewed will recheck in 3 to 4 months otherwise follow-up with above subspecialist interval worsening develop symptoms in 2 weeks  @discharge  The above diagnosis and treatment plan was discussed with the patient patient will continue appropriate diet and exercise as reviewed  Patient will recheck earlier if any interval problems of significance or clinical worsening of the above problems.  Agrees above surveillance.  All question were addressed regarding above meds   "

## 2025-07-22 ASSESSMENT — ENCOUNTER SYMPTOMS
COUGH: 0
FEVER: 0
DYSURIA: 0
TROUBLE SWALLOWING: 0
WEAKNESS: 0
SHORTNESS OF BREATH: 0
SINUS PAIN: 1
BACK PAIN: 1
FATIGUE: 0
RHINORRHEA: 1
FREQUENCY: 1
HEMATURIA: 0
BLOOD IN STOOL: 0
CONFUSION: 0
VOMITING: 0
UNEXPECTED WEIGHT CHANGE: 0
NUMBNESS: 0
ABDOMINAL PAIN: 0
ARTHRALGIAS: 1
CHEST TIGHTNESS: 0
RECTAL PAIN: 0
SINUS PRESSURE: 1
NAUSEA: 0
VOICE CHANGE: 0
PALPITATIONS: 0
MYALGIAS: 0
FLANK PAIN: 0
HEADACHES: 0
LIGHT-HEADEDNESS: 0
SLEEP DISTURBANCE: 0

## 2025-08-04 PROCEDURE — RXMED WILLOW AMBULATORY MEDICATION CHARGE

## 2025-08-05 ENCOUNTER — PHARMACY VISIT (OUTPATIENT)
Dept: PHARMACY | Facility: CLINIC | Age: 60
End: 2025-08-05
Payer: COMMERCIAL

## 2025-08-11 DIAGNOSIS — I10 BENIGN ESSENTIAL HYPERTENSION: ICD-10-CM

## 2025-08-11 DIAGNOSIS — R00.0 TACHYCARDIA: ICD-10-CM

## 2025-08-13 RX ORDER — DILTIAZEM HYDROCHLORIDE 120 MG/1
120 CAPSULE, COATED, EXTENDED RELEASE ORAL DAILY
Qty: 90 CAPSULE | Refills: 3 | Status: SHIPPED | OUTPATIENT
Start: 2025-08-13 | End: 2026-08-13

## 2025-08-18 DIAGNOSIS — E78.2 MIXED HYPERLIPIDEMIA: ICD-10-CM

## 2025-08-18 RX ORDER — LOVASTATIN 20 MG/1
20 TABLET ORAL NIGHTLY
Qty: 90 TABLET | Refills: 3 | Status: SHIPPED | OUTPATIENT
Start: 2025-08-18 | End: 2026-08-18

## 2025-09-02 PROCEDURE — RXMED WILLOW AMBULATORY MEDICATION CHARGE

## 2025-09-04 ENCOUNTER — PHARMACY VISIT (OUTPATIENT)
Dept: PHARMACY | Facility: CLINIC | Age: 60
End: 2025-09-04
Payer: COMMERCIAL

## 2025-10-31 ENCOUNTER — APPOINTMENT (OUTPATIENT)
Dept: PRIMARY CARE | Facility: CLINIC | Age: 60
End: 2025-10-31
Payer: COMMERCIAL

## 2026-01-13 ENCOUNTER — APPOINTMENT (OUTPATIENT)
Dept: CARDIOLOGY | Facility: CLINIC | Age: 61
End: 2026-01-13
Payer: COMMERCIAL

## (undated) DEVICE — TUBING, MANIFOLD, LOW PRESSURE

## (undated) DEVICE — INFLATION DEVICE, COPILOT VALVE AND 20/30 INDEFLATOR

## (undated) DEVICE — CATHETER, BALLOON DILATION, EUPHORA SEMICOMPLIANT 2.0  X 15 MM X 142CM

## (undated) DEVICE — SHEATH, PINNACLE, W/.038 GUIDEWIRE, 10 CM,  6FR INTRODUCER, 6FR DIA, 2.5 CM DIALATOR

## (undated) DEVICE — ACCESS KIT, MINI MAK, 4FR X 10CML, 0.018 X 40CM, SS/SS, ECHO ENHANCED 7CM NDL

## (undated) DEVICE — SHEATH, PINNACLE, W/.035 GUIDEWIRE, 10 CM,  4FR INTRODUCER, 4FR DIA, 2.5 CM DIALATOR

## (undated) DEVICE — CLOSURE SYSTEM, VASCULAR, VASCADE 6/7F VCS

## (undated) DEVICE — GUIDEWIRE, OMNIWIRE, 185CM, STRAIGHT TIP

## (undated) DEVICE — GUIDEWIRE, RUN THROUGH WIRE, 180CM

## (undated) DEVICE — CATHETER, DIAGNOSTIC, 4 FR-JL 4

## (undated) DEVICE — CATHETER, GUIDING, VISTA BRITE 6FR, XB 3.0 100CM

## (undated) DEVICE — CATHETER, DIAGNOSTIC, 4FR-PIG 145 DEG-6SH,MICRO LOOP

## (undated) DEVICE — CATHETER, DIAGNOSTIC, 4FR-3DRC

## (undated) DEVICE — CATHETER, BALLOON DILATION, EUPHORA SEMICOMPLIANT 2.25  X 15 MM X 142CM